# Patient Record
Sex: FEMALE | Race: WHITE | NOT HISPANIC OR LATINO
[De-identification: names, ages, dates, MRNs, and addresses within clinical notes are randomized per-mention and may not be internally consistent; named-entity substitution may affect disease eponyms.]

---

## 2019-08-15 PROBLEM — Z00.00 ENCOUNTER FOR PREVENTIVE HEALTH EXAMINATION: Noted: 2019-08-15

## 2019-08-19 ENCOUNTER — APPOINTMENT (OUTPATIENT)
Dept: MAMMOGRAPHY | Facility: CLINIC | Age: 59
End: 2019-08-19

## 2019-08-23 ENCOUNTER — APPOINTMENT (OUTPATIENT)
Dept: ULTRASOUND IMAGING | Facility: CLINIC | Age: 59
End: 2019-08-23

## 2021-11-26 ENCOUNTER — TRANSCRIPTION ENCOUNTER (OUTPATIENT)
Age: 61
End: 2021-11-26

## 2022-05-27 ENCOUNTER — APPOINTMENT (OUTPATIENT)
Dept: FAMILY MEDICINE | Facility: HOSPITAL | Age: 62
End: 2022-05-27

## 2022-06-11 ENCOUNTER — RESULT REVIEW (OUTPATIENT)
Age: 62
End: 2022-06-11

## 2022-06-11 ENCOUNTER — OUTPATIENT (OUTPATIENT)
Dept: OUTPATIENT SERVICES | Facility: HOSPITAL | Age: 62
LOS: 1 days | End: 2022-06-11

## 2022-06-11 ENCOUNTER — NON-APPOINTMENT (OUTPATIENT)
Age: 62
End: 2022-06-11

## 2022-06-11 ENCOUNTER — APPOINTMENT (OUTPATIENT)
Dept: OBGYN | Facility: HOSPITAL | Age: 62
End: 2022-06-11

## 2022-06-11 DIAGNOSIS — Z12.39 ENCOUNTER FOR OTHER SCREENING FOR MALIGNANT NEOPLASM OF BREAST: ICD-10-CM

## 2022-06-11 DIAGNOSIS — Z11.51 ENCOUNTER FOR SCREENING FOR HUMAN PAPILLOMAVIRUS (HPV): ICD-10-CM

## 2022-06-11 DIAGNOSIS — Z12.4 ENCOUNTER FOR SCREENING FOR MALIGNANT NEOPLASM OF CERVIX: ICD-10-CM

## 2022-06-13 LAB — HPV HIGH+LOW RISK DNA PNL CVX: SIGNIFICANT CHANGE UP

## 2022-06-15 LAB — CYTOLOGY SPEC DOC CYTO: SIGNIFICANT CHANGE UP

## 2022-06-17 ENCOUNTER — OUTPATIENT (OUTPATIENT)
Dept: OUTPATIENT SERVICES | Facility: HOSPITAL | Age: 62
LOS: 1 days | End: 2022-06-17
Payer: COMMERCIAL

## 2022-06-17 ENCOUNTER — RESULT REVIEW (OUTPATIENT)
Age: 62
End: 2022-06-17

## 2022-06-17 ENCOUNTER — APPOINTMENT (OUTPATIENT)
Dept: MAMMOGRAPHY | Facility: CLINIC | Age: 62
End: 2022-06-17
Payer: COMMERCIAL

## 2022-06-17 DIAGNOSIS — Z00.8 ENCOUNTER FOR OTHER GENERAL EXAMINATION: ICD-10-CM

## 2022-06-17 PROCEDURE — 77067 SCR MAMMO BI INCL CAD: CPT

## 2022-06-17 PROCEDURE — 77063 BREAST TOMOSYNTHESIS BI: CPT

## 2022-06-17 PROCEDURE — 77067 SCR MAMMO BI INCL CAD: CPT | Mod: 26

## 2022-06-17 PROCEDURE — 77063 BREAST TOMOSYNTHESIS BI: CPT | Mod: 26

## 2022-06-22 ENCOUNTER — NON-APPOINTMENT (OUTPATIENT)
Age: 62
End: 2022-06-22

## 2022-10-21 ENCOUNTER — APPOINTMENT (OUTPATIENT)
Dept: INTERNAL MEDICINE | Facility: CLINIC | Age: 62
End: 2022-10-21

## 2023-02-02 ENCOUNTER — EMERGENCY (EMERGENCY)
Facility: HOSPITAL | Age: 63
LOS: 1 days | Discharge: HOME | End: 2023-02-02
Attending: EMERGENCY MEDICINE | Admitting: EMERGENCY MEDICINE
Payer: COMMERCIAL

## 2023-02-02 VITALS
WEIGHT: 220.02 LBS | OXYGEN SATURATION: 97 % | RESPIRATION RATE: 16 BRPM | HEIGHT: 65 IN | SYSTOLIC BLOOD PRESSURE: 149 MMHG | HEART RATE: 76 BPM | TEMPERATURE: 98 F | DIASTOLIC BLOOD PRESSURE: 92 MMHG

## 2023-02-02 VITALS
SYSTOLIC BLOOD PRESSURE: 137 MMHG | TEMPERATURE: 98 F | HEART RATE: 71 BPM | OXYGEN SATURATION: 97 % | DIASTOLIC BLOOD PRESSURE: 88 MMHG | RESPIRATION RATE: 16 BRPM

## 2023-02-02 PROCEDURE — 73130 X-RAY EXAM OF HAND: CPT | Mod: 26,RT

## 2023-02-02 PROCEDURE — 73110 X-RAY EXAM OF WRIST: CPT

## 2023-02-02 PROCEDURE — 72040 X-RAY EXAM NECK SPINE 2-3 VW: CPT

## 2023-02-02 PROCEDURE — 73110 X-RAY EXAM OF WRIST: CPT | Mod: 26,RT

## 2023-02-02 PROCEDURE — 99284 EMERGENCY DEPT VISIT MOD MDM: CPT

## 2023-02-02 PROCEDURE — 73030 X-RAY EXAM OF SHOULDER: CPT | Mod: 26,RT

## 2023-02-02 PROCEDURE — 73502 X-RAY EXAM HIP UNI 2-3 VIEWS: CPT | Mod: 26,RT

## 2023-02-02 PROCEDURE — 73130 X-RAY EXAM OF HAND: CPT

## 2023-02-02 PROCEDURE — 72040 X-RAY EXAM NECK SPINE 2-3 VW: CPT | Mod: 26

## 2023-02-02 PROCEDURE — 73562 X-RAY EXAM OF KNEE 3: CPT | Mod: 26,LT

## 2023-02-02 PROCEDURE — 73502 X-RAY EXAM HIP UNI 2-3 VIEWS: CPT

## 2023-02-02 PROCEDURE — 73030 X-RAY EXAM OF SHOULDER: CPT

## 2023-02-02 PROCEDURE — 73562 X-RAY EXAM OF KNEE 3: CPT

## 2023-02-02 RX ORDER — CYCLOBENZAPRINE HYDROCHLORIDE 10 MG/1
1 TABLET, FILM COATED ORAL
Qty: 12 | Refills: 0
Start: 2023-02-02 | End: 2023-02-05

## 2023-02-02 RX ORDER — CYCLOBENZAPRINE HYDROCHLORIDE 10 MG/1
10 TABLET, FILM COATED ORAL ONCE
Refills: 0 | Status: COMPLETED | OUTPATIENT
Start: 2023-02-02 | End: 2023-02-02

## 2023-02-02 RX ORDER — IBUPROFEN 200 MG
1 TABLET ORAL
Qty: 20 | Refills: 0
Start: 2023-02-02 | End: 2023-02-06

## 2023-02-02 RX ORDER — IBUPROFEN 200 MG
600 TABLET ORAL ONCE
Refills: 0 | Status: COMPLETED | OUTPATIENT
Start: 2023-02-02 | End: 2023-02-02

## 2023-02-02 RX ORDER — LIDOCAINE 4 G/100G
1 CREAM TOPICAL ONCE
Refills: 0 | Status: COMPLETED | OUTPATIENT
Start: 2023-02-02 | End: 2023-02-02

## 2023-02-02 RX ADMIN — LIDOCAINE 1 PATCH: 4 CREAM TOPICAL at 12:37

## 2023-02-02 RX ADMIN — Medication 600 MILLIGRAM(S): at 13:07

## 2023-02-02 RX ADMIN — CYCLOBENZAPRINE HYDROCHLORIDE 10 MILLIGRAM(S): 10 TABLET, FILM COATED ORAL at 12:37

## 2023-02-02 RX ADMIN — Medication 600 MILLIGRAM(S): at 12:37

## 2023-02-02 NOTE — ED PROVIDER NOTE - NSFOLLOWUPINSTRUCTIONS_ED_ALL_ED_FT
Follow-up with orthopedist for reevaluation, ongoing care and treatment.  Rest, ice compresses, Weightbearing as tolerated. Ace wrap for compression. Take Motrin as directed for pain and Flexeril as prescribed for muscle spasms.  If having worsening of symptoms or other related symptoms, return to the ER immediately.    Knee Pain    WHAT YOU NEED TO KNOW:    Knee pain may start suddenly, or it may be a long-term problem. You may have pain on the side, front, or back of your knee. You may have knee stiffness and swelling. You may hear popping sounds or feel like your knee is giving way or locking up as you walk. You may feel pain when you sit, stand, walk, or climb up and down stairs. Knee pain can be caused by conditions such as obesity, inflammation, or strains or tears in ligaments or tendons.     DISCHARGE INSTRUCTIONS:    Return to the emergency department if:   •Your pain is worse, even after treatment.       •You cannot bend or straighten your leg completely.       •The swelling around your knee does not go down even with treatment.      •Your knee is painful and hot to the touch.       Contact your healthcare provider if:   •You have questions or concerns about your condition or care.           Medicines: You may need any of the following:   •NSAIDs help decrease swelling and pain or fever. This medicine is available with or without a doctor's order. NSAIDs can cause stomach bleeding or kidney problems in certain people. If you take blood thinner medicine, always ask your healthcare provider if NSAIDs are safe for you. Always read the medicine label and follow directions.      •Acetaminophen decreases pain and fever. It is available without a doctor's order. Ask how much to take and how often to take it. Follow directions. Read the labels of all other medicines you are using to see if they also contain acetaminophen, or ask your doctor or pharmacist. Acetaminophen can cause liver damage if not taken correctly.      •Prescription pain medicine may be given. Ask your healthcare provider how to take this medicine safely. Some prescription pain medicines contain acetaminophen. Do not take other medicines that contain acetaminophen without talking to your healthcare provider. Too much acetaminophen may cause liver damage. Prescription pain medicine may cause constipation. Ask your healthcare provider how to prevent or treat constipation.       •Take your medicine as directed. Contact your healthcare provider if you think your medicine is not helping or if you have side effects. Tell your provider if you are allergic to any medicine. Keep a list of the medicines, vitamins, and herbs you take. Include the amounts, and when and why you take them. Bring the list or the pill bottles to follow-up visits. Carry your medicine list with you in case of an emergency.      What you can do to manage your symptoms:   •Rest your knee so it can heal. Limit activities that increase your pain. Do low-impact exercises, such as walking or swimming.       •Apply ice to help reduce swelling and pain. Use an ice pack, or put crushed ice in a plastic bag. Cover it with a towel before you apply it to your knee. Apply ice for 15 to 20 minutes every hour, or as directed.      •Apply compression to help reduce swelling. Use a brace or bandage only as directed.      •Elevate your knee to help decrease pain and swelling. Elevate your knee while you are sitting or lying down. Prop your leg on pillows to keep your knee above the level of your heart.      •Prevent your knee from moving as directed. Your healthcare provider may put on a cast or splint. You may need to wear a leg brace to stabilize your knee. A leg brace can be adjusted to increase your range of motion as your knee heals.  Hinged Knee Braces            What you can do to prevent knee pain:   •Maintain a healthy weight. Extra weight increases your risk for knee pain. Ask your healthcare provider how much you should weigh. He or she can help you create a safe weight loss plan if you need to lose weight.      •Exercise or train properly. Use the correct equipment for sports. Wear shoes that provide good support. Check your posture often as you exercise, play sports, or train for an event. This can help prevent stress and strain on your knees. Rest between sessions so you do not overwork your knees.      Follow up with your healthcare provider within 24 hours or as directed: You may need follow-up treatments, such as steroid injections to decrease pain. Write down your questions so you remember to ask them during your visits.       Wrist Sprain, Adult      A wrist sprain is a stretch or tear in the strong tissues that connect the wrist bones to each other. These strong tissues are called ligaments. There are three types of wrist sprains:  •Grade 1. The ligament is stretched more than normal. There may be a minor amount of wrist pain.      •Grade 2. The ligament is partially torn. You may be able to move your wrist, but not very much. There may be a moderate amount of wrist pain.      •Grade 3. The ligament or ligaments are completely torn. You may find it difficult to move your wrist even a little. There may be a significant amount of wrist pain.        What are the causes?    This condition may be caused by using the wrist too much during sports, exercise, or work. It can also happen due to a fall or during an accident.      What increases the risk?    You are more likely to develop this condition if:  •You had a previous wrist or arm injury.      •You have poor wrist strength and flexibility.      •You play contact sports, such as football or soccer.      •You participate in sports that may result in a fall, such as skateboarding, biking, skiing, or snowboarding.      •You do not exercise regularly.      •You use exercise equipment that does not fit well.        What are the signs or symptoms?    Symptoms of this condition include:  •Pain in the wrist, arm, or hand.      •Swelling or bruised skin near the wrist, hand, or arm. The skin may look yellow or blue.      •Stiffness or trouble moving the hand.      •Hearing a noise, like a pop or a snap, at the time of injury, or feeling a tear at the time of the injury.      •A warm feeling in the skin around the wrist.        How is this diagnosed?    This condition is diagnosed with a physical exam. Sometimes an X-ray is taken to make sure a bone did not break. You may also have an MRI of your wrist to check for torn ligaments.      How is this treated?    This condition is treated by resting and applying ice to your wrist. Additional treatment may include:  •Taking medicine for pain and inflammation.      •Wearing a splint, brace, or cast for a short period of time to keep your wrist from moving (immobilized).      •Doing exercises to strengthen and stretch your wrist.      •Having surgery. This may be done if the ligament is completely torn.        Follow these instructions at home:    If you have a splint or brace:     •Wear the splint or brace as told by your health care provider. Remove it only as told by your health care provider.       •Loosen it if your fingers tingle, become numb, or turn cold and blue.      •Keep it clean.    •If the splint or brace is not waterproof:   •Do not let it get wet.      •Cover it with a watertight covering when you take a bath or a shower.        If you have a cast:     • Do not put pressure on any part of the cast until it is fully hardened. This may take several hours.       • Do not stick anything inside the cast to scratch your skin. Doing that increases your risk of infection.       •Check the skin around the cast every day. Tell your health care provider about any concerns.       •You may put lotion on dry skin around the edges of the cast. Do not put lotion on the skin underneath the cast.      •Keep it clean.    •If the cast is not waterproof:   •Do not let it get wet.      •Cover it with a watertight covering when you take a bath or shower.          Managing pain, stiffness, and swelling    •If directed, put ice on the injured area. To do this:  •If you have a removable splint or brace, remove it as told by your health care provider.      •Put ice in a plastic bag.      •Place a towel between your skin and the bag or between the splint or cast and the bag.      •Leave the ice on for 20 minutes, 2–3 times a day.      •Remove the ice if your skin turns bright red. This is very important. If you cannot feel pain, heat, or cold, you have a greater risk of damage to the area.        •Move your fingers often to reduce stiffness and swelling.      •Raise (elevate) the injured area above the level of your heart while you are sitting or lying down.      Activity     •Rest your wrist as told by your health care provider. Do not do things that cause pain.      •Ask your health care provider when it is safe to drive if you have a splint, brace, or cast on your wrist.      •Do exercises as told by your health care provider.      •Return to your normal activities as told by your health care provider. Ask your health care provider what activities are safe for you.      General instructions     •Take over-the-counter and prescription medicines only as told by your health care provider.      • Do not use any products that contain nicotine or tobacco, such as cigarettes, e-cigarettes, and chewing tobacco. These can delay healing. If you need help quitting, ask your health care provider.      •Keep all follow-up visits. This is important.        Contact a health care provider if:    •Your pain, bruising, or swelling gets worse.      •Your skin becomes red, gets a rash, or has open sores.      •Your pain does not get better or it gets worse.        Get help right away if:    •You have a new or sudden sharp pain in the hand, arm, or wrist.      •You have tingling or numbness in your hand.      •Your fingers turn white, very red, or cold and blue.      •You cannot move your fingers.        Summary    •A wrist sprain is damage to ligaments in your wrist.      •Wrist sprains can range from mild to severe.      •Return to your normal activities as told by your health care provider. Ask your health care provider what activities are safe for you.      •You may need to wear a splint, brace, or cast for a short period of time.      This information is not intended to replace advice given to you by your health care provider. Make sure you discuss any questions you have with your health care provider.

## 2023-02-02 NOTE — ED PROVIDER NOTE - CARE PROVIDER_API CALL
Min De La Cruz)  Orthopaedic Sports Medicine; Orthopaedic Surgery  [address]  [place] [address]  Phone: [phone]  Fax: [phone]  Follow Up Time: 1-3 Days

## 2023-02-02 NOTE — ED ADULT NURSE NOTE - OBJECTIVE STATEMENT
63 yo female presents to the ED with multiple complaints of pain s/p slip and fal on wet floor, pt stated she slipped on wet floor 63 yo female presents to the ED with multiple complaints of pain s/p slip and fall on wet floor, pt stated she slipped on wet floor. · Patient states that she slipped on wet floor at work this morning when her right leg went in front and fell onto her left knee.  Patient complaining of pain to her left knee, right thumb/right wrist/right shoulder and neck. Pt has old right wrist injury. Denies head trauma, LOC, dizziness, vision changes, use of blood thinners, numbness, tingling, weakness, chest pain, shortness of breath, abdominal pain, nausea, vomiting, open wounds, other injuries or symptoms.

## 2023-02-02 NOTE — ED PROVIDER NOTE - PATIENT PORTAL LINK FT
You can access the FollowMyHealth Patient Portal offered by [facility] by registering at the following website: [email or web address]. By joining [employer]’s FollowMyHealth portal, you will also be able to view your health information using other applications (apps) compatible with our system.

## 2023-02-02 NOTE — ED PROVIDER NOTE - CARE PLAN
Principal Discharge DX:	Left knee injury  Secondary Diagnosis:	Fall  Secondary Diagnosis:	Cervical strain  Secondary Diagnosis:	Right wrist injury   1

## 2023-02-02 NOTE — ED PROVIDER NOTE - PROGRESS NOTE DETAILS
Reevaluated patient at bedside.  Patient feeling much improved.  Discussed the results of all diagnostic testing in ED and copies of all reports given. Will rx motrin and flexeril, ace wrap for left knee, has old right wrist injury, f/u ortho.  An opportunity to ask questions was given.  Discussed the importance of prompt, close medical follow-up.  Patient will return with any changes, concerns or persistent / worsening symptoms.  Understanding of all instructions verbalized.

## 2023-02-02 NOTE — ED PROVIDER NOTE - OBJECTIVE STATEMENT
62-year-old female with history of hypothyroidism presents with complaint of left knee/right wrist/thumb/shoulder and neck pain status post fall today.  Patient states that she slipped on wet floor at work this morning when her right leg went in front and fell onto her left knee.  Patient complaining of pain to her left knee, right thumb/right wrist/right shoulder and neck. Pt has old right wrist injury. Denies head trauma, LOC, dizziness, vision changes, use of blood thinners, numbness, tingling, weakness, chest pain, shortness of breath, abdominal pain, nausea, vomiting, open wounds, other injuries or symptoms.

## 2023-02-02 NOTE — ED ADULT TRIAGE NOTE - NS ED TRIAGE AVPU SCALE
1
Alert-The patient is alert, awake and responds to voice. The patient is oriented to time, place, and person. The triage nurse is able to obtain subjective information.

## 2023-02-02 NOTE — ED PROVIDER NOTE - CLINICAL SUMMARY MEDICAL DECISION MAKING FREE TEXT BOX
Patient is a 62-year-old female who presents to the emergency room with chief complaint of knee pain and right wrist pain and shoulder pain status post fall.  Past medical history of hypothyroidism.  Patient reports that she slipped on a wet floor at work this morning when her right leg gave out and she fell onto her left knee.  She reports pain to her left knee, right thumb, right wrist, right shoulder and neck.  Does endorse a previous right wrist injury.  Denies any head trauma LOC and patient is not on anticoagulants.  Denies any visual changes nausea vomiting chest pain shortness of breath or abdominal pain.  Denies any extremity numbness or weakness.  On exam patient is lying in bed resting comfortably in no acute distress normocephalic atraumatic pupils are equal round and reactive heart is regular rate lungs are clear to auscultation abdomen soft nontender nondistended there is no pelvic instability noted.  No midline C-T-L tenderness to palpation.  Full range of motion of bilateral upper extremities and lower extremities neurovascular intact x4.  Patient with diffuse tenderness to palpation to the left knee no skin changes noted patient able to range the knee but reports pain on range of motion.  There is tenderness to palpation to the right wrist however no acute deformity and patient able to move in all planes.  No snuffbox tenderness to palpation.  There is mild tenderness to palpation over the right shoulder although patient has full range of motion.  Patient presented to the emergency room with pain status post mechanical fall.  Neurovascularly intact with no head trauma.  Will obtain x-ray imaging and monitor.  Independent review of x-ray imaging reveals slight effusion of the left knee with no fracture will place in a 6.  No fracture of the right hip.  There is an old fracture of the right wrist but nothing acute.  No acute shoulder fracture.  No fracture noted to the C-spine.  Results reviewed copy provided patient stable for discharge home with outpatient pain control close follow-up.  Was advised that sometimes fractures in perfect alignment can be missed on initial imaging and therefore prompt follow-up is highly recommended.

## 2023-02-02 NOTE — ED PROVIDER NOTE - MUSCULOSKELETAL, MLM
Spine appears normal, range of motion is not limited, bilateral paravertebral cervical spine tenderness with FROM, no stepoffs/ecchymosis noted, no midline tenderness, +ttp left anterior knee with FROM, skin intact, +mild tenderness right distal radius and proximal right 1st MCPJ with FROM, skin intact, no erythema noted, no snuffbox tenderness, +mild R shoulder ttp, skin intact, FROM RUE, distal pulses and sensation intact, able to make a fist, NVI, LUE NT with FROM, B hips/thigh/tib-fib/ankle/foot NT with FROM, toes warm & mobile, distal pulses and sensation intact, no foot drop B, NVI

## 2023-02-02 NOTE — ED ADULT TRIAGE NOTE - CHIEF COMPLAINT QUOTE
" I slipped on wet floor at work and fell, my neck, upper back, both legs, both knee, right thumb,  wrist, shoulder hurts "

## 2024-01-23 PROBLEM — E03.9 HYPOTHYROIDISM, UNSPECIFIED: Chronic | Status: ACTIVE | Noted: 2023-02-02

## 2024-01-29 ENCOUNTER — APPOINTMENT (OUTPATIENT)
Dept: NEUROLOGY | Facility: CLINIC | Age: 64
End: 2024-01-29

## 2024-12-11 ENCOUNTER — INPATIENT (INPATIENT)
Facility: HOSPITAL | Age: 64
LOS: 1 days | Discharge: HOME | DRG: 948 | End: 2024-12-13
Attending: INTERNAL MEDICINE | Admitting: INTERNAL MEDICINE
Payer: COMMERCIAL

## 2024-12-11 VITALS
WEIGHT: 218.04 LBS | HEIGHT: 65 IN | DIASTOLIC BLOOD PRESSURE: 85 MMHG | TEMPERATURE: 98 F | RESPIRATION RATE: 18 BRPM | OXYGEN SATURATION: 94 % | SYSTOLIC BLOOD PRESSURE: 117 MMHG | HEART RATE: 88 BPM

## 2024-12-11 DIAGNOSIS — R74.01 ELEVATION OF LEVELS OF LIVER TRANSAMINASE LEVELS: ICD-10-CM

## 2024-12-11 LAB
ALBUMIN SERPL ELPH-MCNC: 3.8 G/DL — SIGNIFICANT CHANGE UP (ref 3.3–5)
ALP SERPL-CCNC: 158 U/L — HIGH (ref 40–120)
ALT FLD-CCNC: 1058 U/L — HIGH (ref 12–78)
ANION GAP SERPL CALC-SCNC: 8 MMOL/L — SIGNIFICANT CHANGE UP (ref 5–17)
APPEARANCE UR: ABNORMAL
APTT BLD: 31.9 SEC — SIGNIFICANT CHANGE UP (ref 24.5–35.6)
AST SERPL-CCNC: 181 U/L — HIGH (ref 15–37)
BASOPHILS # BLD AUTO: 0.05 K/UL — SIGNIFICANT CHANGE UP (ref 0–0.2)
BASOPHILS NFR BLD AUTO: 0.6 % — SIGNIFICANT CHANGE UP (ref 0–2)
BILIRUB SERPL-MCNC: 1 MG/DL — SIGNIFICANT CHANGE UP (ref 0.2–1.2)
BILIRUB UR-MCNC: ABNORMAL
BUN SERPL-MCNC: 17 MG/DL — SIGNIFICANT CHANGE UP (ref 7–23)
CALCIUM SERPL-MCNC: 9.9 MG/DL — SIGNIFICANT CHANGE UP (ref 8.5–10.1)
CHLORIDE SERPL-SCNC: 104 MMOL/L — SIGNIFICANT CHANGE UP (ref 96–108)
CO2 SERPL-SCNC: 25 MMOL/L — SIGNIFICANT CHANGE UP (ref 22–31)
COLOR SPEC: SIGNIFICANT CHANGE UP
CREAT SERPL-MCNC: 0.92 MG/DL — SIGNIFICANT CHANGE UP (ref 0.5–1.3)
DIFF PNL FLD: NEGATIVE — SIGNIFICANT CHANGE UP
EGFR: 70 ML/MIN/1.73M2 — SIGNIFICANT CHANGE UP
EOSINOPHIL # BLD AUTO: 0.19 K/UL — SIGNIFICANT CHANGE UP (ref 0–0.5)
EOSINOPHIL NFR BLD AUTO: 2.5 % — SIGNIFICANT CHANGE UP (ref 0–6)
GLUCOSE SERPL-MCNC: 112 MG/DL — HIGH (ref 70–99)
GLUCOSE UR QL: NEGATIVE MG/DL — SIGNIFICANT CHANGE UP
HCT VFR BLD CALC: 43.8 % — SIGNIFICANT CHANGE UP (ref 34.5–45)
HGB BLD-MCNC: 14.9 G/DL — SIGNIFICANT CHANGE UP (ref 11.5–15.5)
IMM GRANULOCYTES NFR BLD AUTO: 0.4 % — SIGNIFICANT CHANGE UP (ref 0–0.9)
INR BLD: 0.96 RATIO — SIGNIFICANT CHANGE UP (ref 0.85–1.16)
KETONES UR-MCNC: ABNORMAL MG/DL
LEUKOCYTE ESTERASE UR-ACNC: ABNORMAL
LIDOCAIN IGE QN: 16 U/L — SIGNIFICANT CHANGE UP (ref 13–75)
LYMPHOCYTES # BLD AUTO: 3.22 K/UL — SIGNIFICANT CHANGE UP (ref 1–3.3)
LYMPHOCYTES # BLD AUTO: 41.5 % — SIGNIFICANT CHANGE UP (ref 13–44)
MCHC RBC-ENTMCNC: 32.7 PG — SIGNIFICANT CHANGE UP (ref 27–34)
MCHC RBC-ENTMCNC: 34 G/DL — SIGNIFICANT CHANGE UP (ref 32–36)
MCV RBC AUTO: 96.1 FL — SIGNIFICANT CHANGE UP (ref 80–100)
MONOCYTES # BLD AUTO: 0.53 K/UL — SIGNIFICANT CHANGE UP (ref 0–0.9)
MONOCYTES NFR BLD AUTO: 6.8 % — SIGNIFICANT CHANGE UP (ref 2–14)
NEUTROPHILS # BLD AUTO: 3.73 K/UL — SIGNIFICANT CHANGE UP (ref 1.8–7.4)
NEUTROPHILS NFR BLD AUTO: 48.2 % — SIGNIFICANT CHANGE UP (ref 43–77)
NITRITE UR-MCNC: NEGATIVE — SIGNIFICANT CHANGE UP
NRBC # BLD: 0 /100 WBCS — SIGNIFICANT CHANGE UP (ref 0–0)
PH UR: 5.5 — SIGNIFICANT CHANGE UP (ref 5–8)
PLATELET # BLD AUTO: 278 K/UL — SIGNIFICANT CHANGE UP (ref 150–400)
POTASSIUM SERPL-MCNC: 4.1 MMOL/L — SIGNIFICANT CHANGE UP (ref 3.5–5.3)
POTASSIUM SERPL-SCNC: 4.1 MMOL/L — SIGNIFICANT CHANGE UP (ref 3.5–5.3)
PROT SERPL-MCNC: 8.2 G/DL — SIGNIFICANT CHANGE UP (ref 6–8.3)
PROT UR-MCNC: 30 MG/DL
PROTHROM AB SERPL-ACNC: 11.2 SEC — SIGNIFICANT CHANGE UP (ref 9.9–13.4)
RBC # BLD: 4.56 M/UL — SIGNIFICANT CHANGE UP (ref 3.8–5.2)
RBC # FLD: 13 % — SIGNIFICANT CHANGE UP (ref 10.3–14.5)
SODIUM SERPL-SCNC: 137 MMOL/L — SIGNIFICANT CHANGE UP (ref 135–145)
SP GR SPEC: 1.03 — HIGH (ref 1–1.03)
UROBILINOGEN FLD QL: 1 MG/DL — SIGNIFICANT CHANGE UP (ref 0.2–1)
WBC # BLD: 7.75 K/UL — SIGNIFICANT CHANGE UP (ref 3.8–10.5)
WBC # FLD AUTO: 7.75 K/UL — SIGNIFICANT CHANGE UP (ref 3.8–10.5)

## 2024-12-11 PROCEDURE — 74183 MRI ABD W/O CNTR FLWD CNTR: CPT | Mod: 26

## 2024-12-11 PROCEDURE — 76705 ECHO EXAM OF ABDOMEN: CPT | Mod: 26

## 2024-12-11 PROCEDURE — 71045 X-RAY EXAM CHEST 1 VIEW: CPT | Mod: 26

## 2024-12-11 PROCEDURE — 93010 ELECTROCARDIOGRAM REPORT: CPT

## 2024-12-11 PROCEDURE — 99285 EMERGENCY DEPT VISIT HI MDM: CPT

## 2024-12-11 PROCEDURE — 74177 CT ABD & PELVIS W/CONTRAST: CPT | Mod: 26,MC

## 2024-12-11 RX ORDER — LEVOTHYROXINE SODIUM 150 MCG
1 TABLET ORAL
Refills: 0 | DISCHARGE

## 2024-12-11 RX ORDER — SODIUM CHLORIDE 9 MG/ML
1000 INJECTION, SOLUTION INTRAMUSCULAR; INTRAVENOUS; SUBCUTANEOUS ONCE
Refills: 0 | Status: COMPLETED | OUTPATIENT
Start: 2024-12-11 | End: 2024-12-11

## 2024-12-11 RX ORDER — FAMOTIDINE 20 MG/1
20 TABLET, FILM COATED ORAL ONCE
Refills: 0 | Status: COMPLETED | OUTPATIENT
Start: 2024-12-11 | End: 2024-12-11

## 2024-12-11 RX ORDER — 0.9 % SODIUM CHLORIDE 0.9 %
1000 INTRAVENOUS SOLUTION INTRAVENOUS
Refills: 0 | Status: DISCONTINUED | OUTPATIENT
Start: 2024-12-11 | End: 2024-12-13

## 2024-12-11 RX ORDER — OMEPRAZOLE 20 MG/1
1 CAPSULE, DELAYED RELEASE ORAL
Refills: 0 | DISCHARGE

## 2024-12-11 RX ORDER — ONDANSETRON HYDROCHLORIDE 4 MG/1
4 TABLET, FILM COATED ORAL EVERY 8 HOURS
Refills: 0 | Status: DISCONTINUED | OUTPATIENT
Start: 2024-12-11 | End: 2024-12-12

## 2024-12-11 RX ORDER — PANTOPRAZOLE SODIUM 40 MG/1
40 TABLET, DELAYED RELEASE ORAL
Refills: 0 | Status: DISCONTINUED | OUTPATIENT
Start: 2024-12-11 | End: 2024-12-12

## 2024-12-11 RX ORDER — ALPRAZOLAM 0.5 MG
1 TABLET ORAL ONCE
Refills: 0 | Status: DISCONTINUED | OUTPATIENT
Start: 2024-12-11 | End: 2024-12-11

## 2024-12-11 RX ORDER — INFLUENZA VIRUS VACCINE 15; 15; 15; 15 UG/.5ML; UG/.5ML; UG/.5ML; UG/.5ML
0.5 SUSPENSION INTRAMUSCULAR ONCE
Refills: 0 | Status: DISCONTINUED | OUTPATIENT
Start: 2024-12-11 | End: 2024-12-13

## 2024-12-11 RX ORDER — LEVOTHYROXINE SODIUM 150 MCG
125 TABLET ORAL DAILY
Refills: 0 | Status: DISCONTINUED | OUTPATIENT
Start: 2024-12-11 | End: 2024-12-12

## 2024-12-11 RX ADMIN — Medication 100 MILLILITER(S): at 20:21

## 2024-12-11 RX ADMIN — FAMOTIDINE 20 MILLIGRAM(S): 20 TABLET, FILM COATED ORAL at 13:45

## 2024-12-11 RX ADMIN — SODIUM CHLORIDE 1000 MILLILITER(S): 9 INJECTION, SOLUTION INTRAMUSCULAR; INTRAVENOUS; SUBCUTANEOUS at 13:45

## 2024-12-11 RX ADMIN — Medication 1 MILLIGRAM(S): at 17:57

## 2024-12-11 NOTE — ED PROVIDER NOTE - ATTENDING APP SHARED VISIT CONTRIBUTION OF CARE
Examination reveals a well-developed well-nourished overweight white female in no acute distress with a protuberant abdomen that is markedly tender in the epigastrium and right upper quadrant.  No guarding no rebound.  Neurologically patient's awake and alert oriented x 4 without any focal neurologic deficits.

## 2024-12-11 NOTE — ED ADULT NURSE NOTE - OBJECTIVE STATEMENT
Pt is AOX4, came to the ED with c/o ABD pain, n/v for 3 days. Pt states she went to PCP yesterday where they started her on PO ABT and antiacid medication yesterday. Pt states no ABD pain at this time. Pt denies n/v today. as PCP pt has elevated liver enzymes and pink-tinge urine. pt is able to ambulate and tolerate PO intake, small amounts. Pt has not vomited today. Allergies to sulfa, nickel, lobster. Hx hypothyroidism.  pending radiology results. care ongoing. call bell placed within reach.

## 2024-12-11 NOTE — ED ADULT NURSE NOTE - NSFALLUNIVINTERV_ED_ALL_ED
Bed/Stretcher in lowest position, wheels locked, appropriate side rails in place/Call bell, personal items and telephone in reach/Instruct patient to call for assistance before getting out of bed/chair/stretcher/Non-slip footwear applied when patient is off stretcher/Maybee to call system/Physically safe environment - no spills, clutter or unnecessary equipment/Purposeful proactive rounding/Room/bathroom lighting operational, light cord in reach

## 2024-12-11 NOTE — ED PROVIDER NOTE - PROGRESS NOTE DETAILS
Stable.  Resting comfortably.  Declines any pain medication emergency room at this time.  CAT scan unremarkable.  US results pending. Liver enzymes significantly elevated.  Spoke with GI Love VILLEDA.  Will see patient emergency for consult.  Dr. Greer will assume care

## 2024-12-11 NOTE — H&P ADULT - NSHPPHYSICALEXAM_GEN_ALL_CORE
Vitals last 24 hrs  T(C): 36.7 (12-11-24 @ 11:59), Max: 36.7 (12-11-24 @ 11:59)  HR: 88 (12-11-24 @ 11:59) (88 - 88)  BP: 117/85 (12-11-24 @ 11:59) (117/85 - 117/85)  RR: 18 (12-11-24 @ 11:59) (18 - 18)  SpO2: 94% (12-11-24 @ 11:59) (94% - 94%)    PHYSICAL EXAM:  GENERAL: NAD, well-groomed, well-developed  HEAD:  Atraumatic, Normocephalic  EYES: EOMI, PERRLA, conjunctiva and sclera clear  ENMT: No tonsillar erythema, exudates, or enlargement; Moist mucous membranes  NECK: Supple, No JVD, Normal thyroid  HEART: Regular rate and rhythm; No murmurs, rubs, or gallops  RESPIRATORY: CTA B/L, No W/R/R  ABDOMEN: Soft, epigastric tenderness, Nondistended; Bowel sounds present  NEUROLOGY: A&Ox3, nonfocal, moving all extremities  EXTREMITIES:  2+ Peripheral Pulses, No clubbing, cyanosis, or edema  SKIN: warm, dry, normal color, no rash or abnormal lesions

## 2024-12-11 NOTE — H&P ADULT - ASSESSMENT
64-year-old female with history of hypothyroidism presents with epigastric pain, nausea, and vomiting the past 5 days.   elevated transaminases, with elevated bilis in outpt labs  US abd- cholelithiasis, no biliary dilation, CT abd - no acute pathology    #Abd pain, elevated LFTs  +cholelithiasis on US  r/o choledocholithiasis-?passed stone  MRCP pending  GI cs appreciated  npo for now, ivf  iv zofran, pain control    #+UA- fu UCx,   pt without urinary symptoms  will monitor    #hypothyroidism- resume synthroid    #GI ppx- resume home ppi    #DVt ppx- ambulatory    [facility]   [phone]

## 2024-12-11 NOTE — CONSULT NOTE ADULT - ASSESSMENT
Transaminitis  Abdominal pain  N/V  Hepatic steatosis  Hx PUD    CT and US noted; hepatic steatosis; cholelithiasis no sign of ccy or biliary dilation  Bili and LFTs noted, trending down from outpatient  Pt currently asymptomatic  Suspect passed CBD stone  Check hepatitis panel for completion  Admit to check MRCP and trend LFTs  PPI 40mg qd  D/w pt and family at bedside    I reviewed the overnight course of events on the unit, re-confirming the patient history. I discussed the care with the patient  Differential diagnosis and plan of care discussed with patient after the evaluation  35 minutes spent on total encounter of which more than fifty percent of the encounter was spent counseling and/or coordinating care by the attending physician.

## 2024-12-11 NOTE — H&P ADULT - NSHPREVIEWOFSYSTEMS_GEN_ALL_CORE
REVIEW OF SYSTEMS:    CONSTITUTIONAL: No weakness, fevers or chills  EYES/ENT: No visual changes;  No vertigo or throat pain   NECK: No pain or stiffness  RESPIRATORY: No cough, wheezing, hemoptysis; No shortness of breath  CARDIOVASCULAR: No chest pain or palpitations  GASTROINTESTINAL: + abdominal or epigastric pain. No nausea, vomiting, or hematemesis; No diarrhea or constipation. No melena or hematochezia.  GENITOURINARY: No dysuria, frequency or hematuria  NEUROLOGICAL: No numbness or weakness  SKIN: No itching, rashes

## 2024-12-11 NOTE — ED PROVIDER NOTE - OBJECTIVE STATEMENT
64-year-old female with history of hypothyroidism presents with epigastric pain, nausea, and vomiting the past 5 days.  Pain started 5 days ago (December 7) after eating BLT.  Has had continued upper abdominal pain with nausea and vomiting since then.  Patient states her urine was bright orange yesterday.  Occasional chills, no known fevers.  No chest pain or shortness of breath.  Patient reports initially had numerous episodes of vomiting the first couple days.  No vomiting yesterday or today.  Was seen by primary care doctor yesterday and had blood work.  Was called today and was told had abnormal labs including bilirubin of 1.7, alk phos of 178, AST of 424, and ALT of 1290.  Overall pain has improved at this time but states has been eating very bland diet.  No current nausea or vomiting.  No dysuria or flank pain.  No previous abdominal surgeries.  Was told by primary care doctor to come to emergency room for further evaluation  PCP Dr Mcgraw

## 2024-12-11 NOTE — H&P ADULT - NSHPLABSRESULTS_GEN_ALL_CORE
LABS:                        14.9   7.75  )-----------( 278      ( 11 Dec 2024 13:41 )             43.8         137  |  104  |  17  ----------------------------<  112[H]  4.1   |  25  |  0.92    Ca    9.9      11 Dec 2024 13:41    TPro  8.2  /  Alb  3.8  /  TBili  1.0  /  DBili  x   /  AST  181[H]  /  ALT  1058[H]  /  AlkPhos  158[H]  12    PT/INR - ( 11 Dec 2024 13:41 )   PT: 11.2 sec;   INR: 0.96 ratio         PTT - ( 11 Dec 2024 13:41 )  PTT:31.9 sec  CAPILLARY BLOOD GLUCOSE            Urinalysis Basic - ( 11 Dec 2024 13:47 )    Color: Dark Yellow / Appearance: Cloudy / S.032 / pH: x  Gluc: x / Ketone: Trace mg/dL  / Bili: Moderate / Urobili: 1.0 mg/dL   Blood: x / Protein: 30 mg/dL / Nitrite: Negative   Leuk Esterase: Small / RBC: 1 /HPF / WBC 6 /HPF   Sq Epi: x / Non Sq Epi: x / Bacteria: Few /HPF        RADIOLOGY & ADDITIONAL TESTS:

## 2024-12-11 NOTE — H&P ADULT - HISTORY OF PRESENT ILLNESS
64-year-old female with history of hypothyroidism presents with epigastric pain, nausea, and vomiting the past 5 days.  Pain started 5 days ago (December 7) after eating BLT.  Has had continued upper abdominal pain with nausea and vomiting since then.  Patient states her urine was bright orange yesterday.  Occasional chills, no known fevers.  No chest pain or shortness of breath.  Patient reports initially had numerous episodes of vomiting the first couple days.  No vomiting yesterday or today.  Was seen by primary care doctor yesterday and had blood work.  Was called today and was told had abnormal labs including bilirubin of 1.7, alk phos of 178, AST of 424, and ALT of 1290.  Overall pain has improved at this time but states has been eating very bland diet.  No current nausea or vomiting.  No dysuria or flank pain.  No previous abdominal surgeries.  Was told by primary care doctor to come to emergency room for further evaluationPt states pain and nausea resolved about a day or two ago. She was sent in by PCP due to elevated bilirubin and LFTs.

## 2024-12-11 NOTE — CONSULT NOTE ADULT - SUBJECTIVE AND OBJECTIVE BOX
SURGERY PA CONSULT NOTE:    CHIEF COMPLAINT:  Patient is a 64y old  Female who presents with a chief complaint of abdominal pain (11 Dec 2024 16:47)    HPI FROM ED:  64-year-old female with history of hypothyroidism presents with epigastric pain, nausea, and vomiting the past 5 days.  Pain started 5 days ago () after eating BLT.  Has had continued upper abdominal pain with nausea and vomiting since then.  Patient states her urine was bright orange yesterday.  Occasional chills, no known fevers.  No chest pain or shortness of breath.  Patient reports initially had numerous episodes of vomiting the first couple days.  No vomiting yesterday or today.  Was seen by primary care doctor yesterday and had blood work.  Was called today and was told had abnormal labs including bilirubin of 1.7, alk phos of 178, AST of 424, and ALT of 1290.  Overall pain has improved at this time but states has been eating very bland diet.  No current nausea or vomiting.  No dysuria or flank pain.  No previous abdominal surgeries.  Was told by primary care doctor to come to emergency room for further evaluation. Pt states pain and nausea resolved about a day or two ago. She was sent in by PCP due to elevated bilirubin and LFTs. (11 Dec 2024 16:47)    INTERVAL HPI:   65 yo female with pmhx of hypothyroidism, diverticulosis, hiatal hernia presented to the ED with abdominal pain since Saturday night with vomiting. Patient also noticed that her urine was dark orange and dark. Also noticed that her bowel movement was grey in color. No diarrhea. Pain had improved but it peaked again on Monday and the pain usually comes after eating.   Hx of diverticulosis; last colonoscopy was 8 years ago, polyps seen per patient; does not follow up with GI now.    PAST MEDICAL HISTORY:  Hypothyroidism  Diverticulosis  Hiatal Hernia    PAST SURGICAL HISTORY:  None per patient     REVIEW OF SYSTEMS:  CONSTITUTIONAL: +chills once  EYES/ENT: No visual changes;  No vertigo or throat pain   NECK: No pain or stiffness  RESPIRATORY: No cough, wheezing, hemoptysis; No shortness of breath  CARDIOVASCULAR: No chest pain or palpitations  GASTROINTESTINAL: +epigastric pain. +vomiting, or hematemesis; No diarrhea or constipation. No melena or hematochezia.  GENITOURINARY: +dark urine  NEUROLOGICAL: No numbness or weakness  SKIN: No itching, rashes    MEDICATIONS:  Home Medications:  omeprazole 20 mg oral delayed release tablet: 1 tab(s) orally once a day (11 Dec 2024 16:48)  Synthroid 125 mcg (0.125 mg) oral tablet: 1 tab(s) orally once a day (11 Dec 2024 16:48)    MEDICATIONS  (STANDING):  dextrose 5% + sodium chloride 0.9%. 1000 milliLiter(s) (100 mL/Hr) IV Continuous <Continuous>  levothyroxine 125 MICROGram(s) Oral daily  pantoprazole    Tablet 40 milliGRAM(s) Oral before breakfast    MEDICATIONS  (PRN):  morphine  - Injectable 2 milliGRAM(s) IV Push every 6 hours PRN Severe Pain (7 - 10)  ondansetron Injectable 4 milliGRAM(s) IV Push every 8 hours PRN Nausea and/or Vomiting    ALLERGIES:  Crab (Other)  Lobster (Other)  Nickel (Rash)  Sulfa drugs (Rash)    SOCIAL HISTORY:  Smoking: Denies  ETOH: Rarely  DRUGS: Denies    FAMILY HISTORY:  CAD (Paternal and maternal side)      VITAL SIGNS:  Vital Signs Last 24 Hrs  T(C): 36.7 (11 Dec 2024 17:34), Max: 36.7 (11 Dec 2024 11:59)  T(F): 98 (11 Dec 2024 17:34), Max: 98 (11 Dec 2024 11:59)  HR: 75 (11 Dec 2024 17:34) (75 - 88)  BP: 121/82 (11 Dec 2024 17:34) (117/85 - 121/82)  RR: 18 (11 Dec 2024 17:34) (18 - 18)  SpO2: 98% (11 Dec 2024 17:34) (94% - 98%)    Parameters below as of 11 Dec 2024 17:34  Patient On (Oxygen Delivery Method): room air    PHYSICAL EXAM:  GENERAL: NAD, lying in bed comfortably  HEAD:  Atraumatic, normocephalic  EYES: EOMI, PERRLA, conjunctiva and sclera clear  NECK: Supple, trachea midline, no JVD  HEART: Regular rate and rhythm, no murmurs, rubs, or gallops  LUNGS: Unlabored respirations. Clear to auscultation bilaterally, no crackles, wheezing, or rhonchi  ABDOMEN: Soft, nontender, nondistended, +BS  EXTREMITIES: No calf tenderness  NERVOUS SYSTEM:  A&Ox3, moving all extremities, no focal deficits   SKIN: No rashes or lesions    LABS:                        14.9   7.75  )-----------( 278      ( 11 Dec 2024 13:41 )             43.8     137  |  104  |  17  ----------------------------<  112[H]  4.1   |  25  |  0.92    Ca    9.9      11 Dec 2024 13:41    TPro  8.2  /  Alb  3.8  /  TBili  1.0  /  DBili  x   /  AST  181[H]  /  ALT  1058[H]  /  AlkPhos  158[H]  12-11    PT/INR - ( 11 Dec 2024 13:41 )   PT: 11.2 sec;   INR: 0.96 ratio      PTT - ( 11 Dec 2024 13:41 )  PTT:31.9 sec  Urinalysis Basic - ( 11 Dec 2024 13:47 )    Color: Dark Yellow / Appearance: Cloudy / S.032 / pH: x  Gluc: x / Ketone: Trace mg/dL  / Bili: Moderate / Urobili: 1.0 mg/dL   Blood: x / Protein: 30 mg/dL / Nitrite: Negative   Leuk Esterase: Small / RBC: 1 /HPF / WBC 6 /HPF   Sq Epi: x / Non Sq Epi: x / Bacteria: Few /HPF    LIVER FUNCTIONS - ( 11 Dec 2024 13:41 )  Alb: 3.8 g/dL / Pro: 8.2 g/dL / ALK PHOS: 158 U/L / ALT: 1058 U/L / AST: 181 U/L / GGT: x           Urinalysis with Rflx Culture (collected 11 Dec 2024 13:47)      RADIOLOGY & ADDITIONAL STUDIES:  < from: US Abdomen Upper Quadrant Right (24 @ 14:57) >    ACC: [id number] EXAM:  US ABDOMEN RT UPR QUADRANT   ORDERED BY: CARL JEROME     PROCEDURE DATE:  2024    INTERPRETATION:  CLINICAL INFORMATION: Right upper quadrant pain  COMPARISON: None available.  TECHNIQUE: Sonography of theright upper quadrant.    FINDINGS:  Liver: Steatosis.  Bile ducts: Normal caliber. Common bile duct measures 6 mm.  Gallbladder: Cholelithiasis and gallbladder sludge without wall thickening or pericholecystic fluid..  Pancreas: Visualized portionsare within normal limits.  Right kidney: 9.6 cm. No hydronephrosis.  Ascites: None.  IVC: Visualized portions are within normal limits.    IMPRESSION:  Cholelithiasis without secondary signs of acute cholecystitis or biliary dilatation.Fatty liver.    --- End of Report ---    < from: CT Abdomen and Pelvis w/ IV Cont (24 @ 14:29) >    ACC: [id number] EXAM:  CT ABDOMEN AND PELVIS IC   ORDERED BY: CARL JEROME     PROCEDURE DATE:  2024    INTERPRETATION:  CLINICAL INFORMATION: Upper abdominal pain  COMPARISON: None.    CONTRAST/COMPLICATIONS:  IV Contrast: Omnipaque 350  90 cc administered   10 cc discarded  Oral Contrast: NONE    PROCEDURE:  CT of the Abdomen and Pelvis was performed.  Sagittal and coronal reformats were performed.    FINDINGS:  LOWER CHEST: Small hiatal hernia.    LIVER: Steatosis.  BILE DUCTS: Normal caliber.  GALLBLADDER: Within normal limits.  SPLEEN: Within normal limits.  PANCREAS: Within normal limits.  ADRENALS: Within normal limits.  KIDNEYS/URETERS: Within normal limits.    BLADDER: Within normal limits.  REPRODUCTIVE ORGANS:Myomatous uterus    BOWEL: No bowel obstruction. Colonic diverticulosis without acute diverticulitis. Appendix normal  PERITONEUM/RETROPERITONEUM: Within normal limits.  VESSELS: Within normal limits.  LYMPH NODES: No lymphadenopathy.  ABDOMINAL WALL: Tiny fat-containing umbilical hernia.  BONES: Within normal limits.    IMPRESSION:  No acute inflammatory or obstructive pathology. Diverticulosis coli without acute diverticulitis. Additional findings as discussed    --- End of Report ---      ASSESSMENT:  65 yo female with pmhx of hypothyroidism, diverticulosis, hiatal hernia admitted with abdominal pain, acute cholecystitis, transaminitis without leukocytosis, nontender abdomen currently  AFVSS      PLAN:  - NPO after midnight  - Planning for robotic cholecystectomy tomorrow   - Preop  - ICG  - Abx  - AM labs  - Trend LFTs  - T. bili wnl  - F/U MRCP  - Appreciate GI recommendations   - Case discussed with patient and Dr. Bailey
[facility]  Farzad Pond PA-C  [address] [place] [address] [phone]      Chief Complaint:  Patient is a 64y old  Female who presents with a chief complaint of abnormal labs    HPI:  64-year-old female with history of hypothyroidism presents with epigastric pain, nausea, and vomiting the past 5 days.  Pain started 5 days ago () after eating BLT.  Has had continued upper abdominal pain with nausea and vomiting since then.  Patient states her urine was bright orange yesterday.  Occasional chills, no known fevers.  No chest pain or shortness of breath.  Patient reports initially had numerous episodes of vomiting the first couple days.  No vomiting yesterday or today.  Was seen by primary care doctor yesterday and had blood work.  Was called today and was told had abnormal labs including bilirubin of 1.7, alk phos of 178, AST of 424, and ALT of 1290.  Overall pain has improved at this time but states has been eating very bland diet.  No current nausea or vomiting.  No dysuria or flank pain.  No previous abdominal surgeries.  Was told by primary care doctor to come to emergency room for further evaluation    INTERVAL HPI  Pt s/e with family at bedside in emergency department  Discussed same hx as above  Pt states she has had similar pain in the past but never as severe, often associated with spicy food  She had egd with Dr Abernathy within past couple years, reportedly had gastric ulcer at the time  Pt states pain and nausea resolved about a day or two ago. She was sent in by PCP due to elevated bilirubin and LFTs.  Denies prior hx of gallbladder disease  Denies EtOH misuse    Allergies:  Crab (Other)  Lobster (Other)  Nickel (Rash)  sulfa drugs (Rash)    PMHX/PSHX:  Hypothyroidism      ROS:   General:  No fevers, chills, night sweats, fatigue  Eyes:  Good vision, no reported pain  ENT:  No sore throat, pain, runny nose, dysphagia  CV:  No pain, palpitations, hypo/hypertension  Resp:  No dyspnea, cough, tachypnea, wheezing  GI:  +pain, +nausea, +vomiting, No diarrhea, No constipation, No weight loss, No fever, No pruritis, No rectal bleeding, No tarry stools, No dysphagia  :  No pain, bleeding, incontinence, nocturia  Muscle:  No pain, weakness  Neuro:  No weakness, tingling, memory problems  Psych:  No fatigue, insomnia, mood problems, depression  Endocrine:  No polyuria, polydipsia, cold/heat intolerance  Heme:  No petechiae, ecchymosis, easy bruisability  Skin:  No rash, tattoos, scars, edema      PHYSICAL EXAM:   Vital Signs:  Vital Signs Last 24 Hrs  T(C): 36.7 (11 Dec 2024 11:59), Max: 36.7 (11 Dec 2024 11:59)  T(F): 98 (11 Dec 2024 11:59), Max: 98 (11 Dec 2024 11:59)  HR: 88 (11 Dec 2024 11:59) (88 - 88)  BP: 117/85 (11 Dec 2024 11:59) (117/85 - 117/85)  BP(mean): --  RR: 18 (11 Dec 2024 11:59) (18 - 18)  SpO2: 94% (11 Dec 2024 11:59) (94% - 94%)    Parameters below as of 11 Dec 2024 11:59  Patient On (Oxygen Delivery Method): room air      Daily Height in cm: 165.1 (11 Dec 2024 11:59)    Daily     GENERAL:  Appears stated age  HEENT:  NC/AT  CHEST:  Full & symmetric excursion  HEART:  Regular rhythm  ABDOMEN:  Soft, non-tender, non-distended  EXTEREMITIES:  no cyanosis, clubbing or edema  SKIN:  No rash  NEURO:  Alert    LABS:                        14.9   7.75  )-----------( 278      ( 11 Dec 2024 13:41 )             43.8         137  |  104  |  17  ----------------------------<  112[H]  4.1   |  25  |  0.92    Ca    9.9      11 Dec 2024 13:41    TPro  8.2  /  Alb  3.8  /  TBili  1.0  /  DBili  x   /  AST  181[H]  /  ALT  1058[H]  /  AlkPhos  158[H]      LIVER FUNCTIONS - ( 11 Dec 2024 13:41 )  Alb: 3.8 g/dL / Pro: 8.2 g/dL / ALK PHOS: 158 U/L / ALT: 1058 U/L / AST: 181 U/L / GGT: x           PT/INR - ( 11 Dec 2024 13:41 )   PT: 11.2 sec;   INR: 0.96 ratio         PTT - ( 11 Dec 2024 13:41 )  PTT:31.9 sec  Urinalysis Basic - ( 11 Dec 2024 13:47 )    Color: Dark Yellow / Appearance: Cloudy / S.032 / pH: x  Gluc: x / Ketone: Trace mg/dL  / Bili: Moderate / Urobili: 1.0 mg/dL   Blood: x / Protein: 30 mg/dL / Nitrite: Negative   Leuk Esterase: Small / RBC: 1 /HPF / WBC 6 /HPF   Sq Epi: x / Non Sq Epi: x / Bacteria: Few /HPF        Lipase serum16        Imaging:  < from: CT Abdomen and Pelvis w/ IV Cont (24 @ 14:29) >    ACC: [id number] EXAM:  CT ABDOMEN AND PELVIS IC   ORDERED BY: CARL JEROME     PROCEDURE DATE:  2024          INTERPRETATION:  CLINICAL INFORMATION: Upper abdominal pain    COMPARISON: None.    CONTRAST/COMPLICATIONS:  IV Contrast: Omnipaque 350  90 cc administered   10 cc discarded  Oral Contrast: NONE  .    PROCEDURE:  CT of the Abdomen and Pelvis was performed.  Sagittal and coronal reformats were performed.    FINDINGS:  LOWER CHEST: Small hiatal hernia.    LIVER: Steatosis.  BILE DUCTS: Normal caliber.  GALLBLADDER: Within normal limits.  SPLEEN: Within normal limits.  PANCREAS: Within normal limits.  ADRENALS: Within normal limits.  KIDNEYS/URETERS: Within normal limits.    BLADDER: Within normal limits.  REPRODUCTIVE ORGANS:Myomatous uterus    BOWEL: No bowel obstruction. Colonic diverticulosis without acute   diverticulitis. Appendix normal  PERITONEUM/RETROPERITONEUM: Within normal limits.  VESSELS: Within normal limits.  LYMPH NODES: No lymphadenopathy.  ABDOMINAL WALL: Tiny fat-containing umbilical hernia.  BONES: Within normal limits.    IMPRESSION:  No acute inflammatory or obstructive pathology.    Diverticulosis coli without acute diverticulitis.    Additional findings as discussed  --- End of Report ---      LAUREANO PICHARDO MD; Attending Radiologist  This document has been electronically signed. Dec 11 2024  2:55PM    < end of copied text >      < from: US Abdomen Upper Quadrant Right (12.11.24 @ 14:57) >    ACC: [id number] EXAM:  US ABDOMEN RT UPR QUADRANT   ORDERED BY: CARL JEROME     PROCEDURE DATE:  2024          INTERPRETATION:  CLINICAL INFORMATION: Right upper quadrant pain    COMPARISON: None available.    TECHNIQUE: Sonography of theright upper quadrant.    FINDINGS:  Liver: Steatosis.  Bile ducts: Normal caliber. Common bile duct measures 6 mm.  Gallbladder: Cholelithiasis and gallbladder sludge without wall   thickening or pericholecystic fluid..  Pancreas: Visualized portionsare within normal limits.  Right kidney: 9.6 cm. No hydronephrosis.  Ascites: None.  IVC: Visualized portions are within normal limits.    IMPRESSION:  Cholelithiasis without secondary signs of acute cholecystitis or biliary   dilatation.  Fatty liver.    --- End of Report ---    LAUREANO PICHARDO MD; Attending Radiologist  This document has been electronically signed. Dec 11 2024  3:03PM    < end of copied text >

## 2024-12-11 NOTE — PATIENT PROFILE ADULT - FALL HARM RISK - UNIVERSAL INTERVENTIONS
Bed in lowest position, wheels locked, appropriate side rails in place/Call bell, personal items and telephone in reach/Instruct patient to call for assistance before getting out of bed or chair/Non-slip footwear when patient is out of bed/[place] to call system/Physically safe environment - no spills, clutter or unnecessary equipment/Purposeful Proactive Rounding/Room/bathroom lighting operational, light cord in reach

## 2024-12-11 NOTE — ED PROVIDER NOTE - DIFFERENTIAL DIAGNOSIS
Differentials include but not limited to cholecystitis, blocked biliary stone, pancreatitis Differential Diagnosis

## 2024-12-11 NOTE — ED PROVIDER NOTE - CLINICAL SUMMARY MEDICAL DECISION MAKING FREE TEXT BOX
Patient 64-year-old white female has had intermittent waxing and waning midepigastric and right upper quadrant abdominal pain with nausea and vomiting for the past few days.  No fever no chills no diarrhea no melena no bright red blood per rectum.  No dysuria no hematuria.  This case will require independently performed history and physical as well as labs liver functions as well as ultrasound of the right upper quadrant to assess for possibility of acute cholecystitis.

## 2024-12-12 ENCOUNTER — TRANSCRIPTION ENCOUNTER (OUTPATIENT)
Age: 64
End: 2024-12-12

## 2024-12-12 LAB
ABO RH CONFIRMATION: SIGNIFICANT CHANGE UP
ALBUMIN SERPL ELPH-MCNC: 2.9 G/DL — LOW (ref 3.3–5)
ALP SERPL-CCNC: 113 U/L — SIGNIFICANT CHANGE UP (ref 40–120)
ALT FLD-CCNC: 629 U/L — HIGH (ref 12–78)
ANION GAP SERPL CALC-SCNC: 6 MMOL/L — SIGNIFICANT CHANGE UP (ref 5–17)
AST SERPL-CCNC: 86 U/L — HIGH (ref 15–37)
BILIRUB SERPL-MCNC: 0.6 MG/DL — SIGNIFICANT CHANGE UP (ref 0.2–1.2)
BUN SERPL-MCNC: 12 MG/DL — SIGNIFICANT CHANGE UP (ref 7–23)
CALCIUM SERPL-MCNC: 8.5 MG/DL — SIGNIFICANT CHANGE UP (ref 8.5–10.1)
CHLORIDE SERPL-SCNC: 109 MMOL/L — HIGH (ref 96–108)
CO2 SERPL-SCNC: 26 MMOL/L — SIGNIFICANT CHANGE UP (ref 22–31)
CREAT SERPL-MCNC: 0.84 MG/DL — SIGNIFICANT CHANGE UP (ref 0.5–1.3)
EGFR: 79 ML/MIN/1.73M2 — SIGNIFICANT CHANGE UP
GLUCOSE SERPL-MCNC: 117 MG/DL — HIGH (ref 70–99)
HAV IGM SER-ACNC: SIGNIFICANT CHANGE UP
HBV CORE IGM SER-ACNC: SIGNIFICANT CHANGE UP
HBV SURFACE AG SER-ACNC: SIGNIFICANT CHANGE UP
HCT VFR BLD CALC: 37.9 % — SIGNIFICANT CHANGE UP (ref 34.5–45)
HCV AB S/CO SERPL IA: 0.21 S/CO — SIGNIFICANT CHANGE UP (ref 0–0.99)
HCV AB SERPL-IMP: SIGNIFICANT CHANGE UP
HGB BLD-MCNC: 12.5 G/DL — SIGNIFICANT CHANGE UP (ref 11.5–15.5)
MCHC RBC-ENTMCNC: 32.6 PG — SIGNIFICANT CHANGE UP (ref 27–34)
MCHC RBC-ENTMCNC: 33 G/DL — SIGNIFICANT CHANGE UP (ref 32–36)
MCV RBC AUTO: 98.7 FL — SIGNIFICANT CHANGE UP (ref 80–100)
NRBC # BLD: 0 /100 WBCS — SIGNIFICANT CHANGE UP (ref 0–0)
PLATELET # BLD AUTO: 225 K/UL — SIGNIFICANT CHANGE UP (ref 150–400)
POTASSIUM SERPL-MCNC: 3.6 MMOL/L — SIGNIFICANT CHANGE UP (ref 3.5–5.3)
POTASSIUM SERPL-SCNC: 3.6 MMOL/L — SIGNIFICANT CHANGE UP (ref 3.5–5.3)
PROT SERPL-MCNC: 6.3 G/DL — SIGNIFICANT CHANGE UP (ref 6–8.3)
RBC # BLD: 3.84 M/UL — SIGNIFICANT CHANGE UP (ref 3.8–5.2)
RBC # FLD: 12.9 % — SIGNIFICANT CHANGE UP (ref 10.3–14.5)
SODIUM SERPL-SCNC: 141 MMOL/L — SIGNIFICANT CHANGE UP (ref 135–145)
WBC # BLD: 6.07 K/UL — SIGNIFICANT CHANGE UP (ref 3.8–10.5)
WBC # FLD AUTO: 6.07 K/UL — SIGNIFICANT CHANGE UP (ref 3.8–10.5)

## 2024-12-12 PROCEDURE — 88304 TISSUE EXAM BY PATHOLOGIST: CPT | Mod: 26

## 2024-12-12 DEVICE — LIGATING CLIPS WECK HEMOLOK POLYMER MEDIUM-LARGE (GREEN) 6: Type: IMPLANTABLE DEVICE | Status: FUNCTIONAL

## 2024-12-12 RX ORDER — PANTOPRAZOLE SODIUM 40 MG/1
40 TABLET, DELAYED RELEASE ORAL
Refills: 0 | Status: DISCONTINUED | OUTPATIENT
Start: 2024-12-12 | End: 2024-12-13

## 2024-12-12 RX ORDER — ONDANSETRON HYDROCHLORIDE 4 MG/1
4 TABLET, FILM COATED ORAL EVERY 6 HOURS
Refills: 0 | Status: DISCONTINUED | OUTPATIENT
Start: 2024-12-12 | End: 2024-12-13

## 2024-12-12 RX ORDER — CEFAZOLIN SODIUM 10 G
2000 VIAL (EA) INJECTION ONCE
Refills: 0 | Status: DISCONTINUED | OUTPATIENT
Start: 2024-12-12 | End: 2024-12-12

## 2024-12-12 RX ORDER — ONDANSETRON HYDROCHLORIDE 4 MG/1
4 TABLET, FILM COATED ORAL EVERY 8 HOURS
Refills: 0 | Status: DISCONTINUED | OUTPATIENT
Start: 2024-12-12 | End: 2024-12-12

## 2024-12-12 RX ORDER — DIMENHYDRINATE 50 MG
25 TABLET, EXTENDED RELEASE ORAL ONCE
Refills: 0 | Status: DISCONTINUED | OUTPATIENT
Start: 2024-12-12 | End: 2024-12-12

## 2024-12-12 RX ORDER — IBUPROFEN 200 MG
1 TABLET ORAL
Qty: 0 | Refills: 0 | DISCHARGE
Start: 2024-12-12

## 2024-12-12 RX ORDER — INDOCYANINE GREEN AND WATER 25 MG
5 KIT INJECTION ONCE
Refills: 0 | Status: COMPLETED | OUTPATIENT
Start: 2024-12-12 | End: 2024-12-12

## 2024-12-12 RX ORDER — HYDROMORPHONE HYDROCHLORIDE 2 MG/1
0.5 TABLET ORAL
Refills: 0 | Status: DISCONTINUED | OUTPATIENT
Start: 2024-12-12 | End: 2024-12-12

## 2024-12-12 RX ORDER — OXYCODONE HYDROCHLORIDE 30 MG/1
5 TABLET ORAL EVERY 4 HOURS
Refills: 0 | Status: DISCONTINUED | OUTPATIENT
Start: 2024-12-12 | End: 2024-12-13

## 2024-12-12 RX ORDER — IBUPROFEN 200 MG
600 TABLET ORAL EVERY 6 HOURS
Refills: 0 | Status: DISCONTINUED | OUTPATIENT
Start: 2024-12-12 | End: 2024-12-13

## 2024-12-12 RX ORDER — LEVOTHYROXINE SODIUM 150 MCG
125 TABLET ORAL DAILY
Refills: 0 | Status: DISCONTINUED | OUTPATIENT
Start: 2024-12-12 | End: 2024-12-13

## 2024-12-12 RX ORDER — ONDANSETRON HYDROCHLORIDE 4 MG/1
4 TABLET, FILM COATED ORAL ONCE
Refills: 0 | Status: COMPLETED | OUTPATIENT
Start: 2024-12-12 | End: 2024-12-12

## 2024-12-12 RX ORDER — 0.9 % SODIUM CHLORIDE 0.9 %
1000 INTRAVENOUS SOLUTION INTRAVENOUS
Refills: 0 | Status: DISCONTINUED | OUTPATIENT
Start: 2024-12-12 | End: 2024-12-12

## 2024-12-12 RX ORDER — ACETAMINOPHEN 500MG 500 MG/1
1000 TABLET, COATED ORAL EVERY 6 HOURS
Refills: 0 | Status: DISCONTINUED | OUTPATIENT
Start: 2024-12-12 | End: 2024-12-13

## 2024-12-12 RX ADMIN — ONDANSETRON HYDROCHLORIDE 4 MILLIGRAM(S): 4 TABLET, FILM COATED ORAL at 18:07

## 2024-12-12 RX ADMIN — HYDROMORPHONE HYDROCHLORIDE 0.5 MILLIGRAM(S): 2 TABLET ORAL at 17:47

## 2024-12-12 RX ADMIN — INDOCYANINE GREEN AND WATER 5 MILLIGRAM(S): KIT at 12:13

## 2024-12-12 RX ADMIN — OXYCODONE HYDROCHLORIDE 5 MILLIGRAM(S): 30 TABLET ORAL at 22:54

## 2024-12-12 RX ADMIN — Medication 100 MILLILITER(S): at 05:45

## 2024-12-12 RX ADMIN — HYDROMORPHONE HYDROCHLORIDE 0.5 MILLIGRAM(S): 2 TABLET ORAL at 17:45

## 2024-12-12 RX ADMIN — ONDANSETRON HYDROCHLORIDE 4 MILLIGRAM(S): 4 TABLET, FILM COATED ORAL at 20:29

## 2024-12-12 RX ADMIN — HYDROMORPHONE HYDROCHLORIDE 0.5 MILLIGRAM(S): 2 TABLET ORAL at 18:02

## 2024-12-12 RX ADMIN — Medication 75 MILLILITER(S): at 17:30

## 2024-12-12 RX ADMIN — OXYCODONE HYDROCHLORIDE 5 MILLIGRAM(S): 30 TABLET ORAL at 23:30

## 2024-12-12 RX ADMIN — HYDROMORPHONE HYDROCHLORIDE 0.5 MILLIGRAM(S): 2 TABLET ORAL at 17:30

## 2024-12-12 RX ADMIN — Medication 125 MICROGRAM(S): at 05:43

## 2024-12-12 RX ADMIN — HYDROMORPHONE HYDROCHLORIDE 0.5 MILLIGRAM(S): 2 TABLET ORAL at 18:22

## 2024-12-12 RX ADMIN — HYDROMORPHONE HYDROCHLORIDE 0.5 MILLIGRAM(S): 2 TABLET ORAL at 18:07

## 2024-12-12 NOTE — DISCHARGE NOTE PROVIDER - HOSPITAL COURSE
64-year-old female with history of hypothyroidism presents with epigastric pain, nausea, and vomiting the past 5 days.   elevated transaminases, with elevated bilis in outpt labs  US abd- cholelithiasis, no biliary dilation, CT abd - no acute pathology    #Abd pain, elevated LFTs  +cholelithiasis on US  r/o choledocholithiasis-likely passed stone  MRCP +cholelithiasis  GI and surgery consulted  sp robotic assisted cholecystectomy , tolerated procedure well  postop tolerated diet well  dc  planning outpt pcp, surgery and gi fu        LABS:                        12.5   6.07  )-----------( 225      ( 12 Dec 2024 07:10 )             37.9     12-12    141  |  109[H]  |  12  ----------------------------<  117[H]  3.6   |  26  |  0.84    Ca    8.5      12 Dec 2024 07:10    TPro  6.3  /  Alb  2.9[L]  /  TBili  0.6  /  DBili  x   /  AST  86[H]  /  ALT  629[H]  /  AlkPhos  113  12-12    PT/INR - ( 11 Dec 2024 13:41 )   PT: 11.2 sec;   INR: 0.96 ratio        MR MRCP w/wo IV Cont (12.11.24 @ 19:20) >  1. Hepatic steatosis.  2. Cholelithiasis without evidence of cholecystitis.      US Abdomen Upper Quadrant Right (12.11.24 @ 14:57) >  Cholelithiasis without secondary signs of acute cholecystitis or biliary   dilatation.  Fatty liver.

## 2024-12-12 NOTE — DISCHARGE NOTE PROVIDER - PROVIDER TOKENS
PROVIDER:[TOKEN:[[id number]],FOLLOWUP:[2 weeks]],PROVIDER:[TOKEN:[[id number]],FOLLOWUP:[2 weeks]],PROVIDER:[TOKEN:[[id number]],FOLLOWUP:[2 weeks]]

## 2024-12-12 NOTE — PRE-OP CHECKLIST - AS TEMP SITE
Patient stabilized after being administered STAT IM doses of Haldol 5mg/ml and Benadryl 50mg/ml. Patient verbalized that he heard his mother's voice and was told to join her.
oral

## 2024-12-12 NOTE — DISCHARGE NOTE PROVIDER - CARE PROVIDER_API CALL
Tadeo Gonzalez  Internal Medicine  [address], [place] [address]  Phone: [phone]  Fax: [phone]  Follow Up Time: 2 weeks    Paresh Pierre  Gastroenterology  [address] [place] [address]  Phone: [phone]  Fax: [phone]  Follow Up Time: 2 weeks    Ziggy Bailey [place]  Surgery  [address], [address]  [place] [address]  Phone: [phone]  Fax: [phone]  Follow Up Time: 2 weeks

## 2024-12-12 NOTE — DISCHARGE NOTE PROVIDER - NSDCMRMEDTOKEN_GEN_ALL_CORE_FT
ibuprofen 600 mg oral tablet: 1 tab(s) orally every 6 hours As needed Moderate Pain (4 - 6)  omeprazole 20 mg oral delayed release tablet: 1 tab(s) orally once a day  Synthroid 125 mcg (0.125 mg) oral tablet: 1 tab(s) orally once a day   acetaminophen 500 mg oral tablet: 2 tab(s) orally every 6 hours Alternate with Ibuprofen  Colace 100 mg oral capsule: 1 cap(s) orally 2 times a day  omeprazole 20 mg oral delayed release tablet: 1 tab(s) orally once a day  oxyCODONE 5 mg oral tablet: 1 tab(s) orally every 6 hours as needed for Severe Pain (7 - 10) MDD: 3  Synthroid 125 mcg (0.125 mg) oral tablet: 1 tab(s) orally once a day   acetaminophen 500 mg oral tablet: 2 tab(s) orally every 6 hours Alternate with Ibuprofen  Colace 100 mg oral capsule: 1 cap(s) orally 2 times a day as needed for  constipation  ibuprofen 600 mg oral tablet: 1 tab(s) orally every 6 hours as needed for  mild pain Alternate with Tylenol  omeprazole 20 mg oral delayed release tablet: 1 tab(s) orally once a day  oxyCODONE 5 mg oral tablet: 1 tab(s) orally every 6 hours as needed for Severe Pain (7 - 10) MDD: 4  Synthroid 125 mcg (0.125 mg) oral tablet: 1 tab(s) orally once a day

## 2024-12-12 NOTE — DISCHARGE NOTE PROVIDER - NSDCFUADDINST_GEN_ALL_CORE_FT
Keep glue clean, dry and intact x 24 hrs. Apply water proof ice pack 20 mins on, 20 mins off to help decrease pain and swelling. You may begin showering as usual but Do NOT pick glue. Do not scrub or soak incision site. Pat dry. NO tub baths, NO swimming pools. No hot tubs.  Do not lift anything over 10 lbs.  Take frequent walks.  You may take over the counter stool softener such as colace as needed for constipation while taking pain medication.  For pain after surgery, take the followin. 600mg prescription ibuprofen - take 1 pill every 6 hours with food regularly for the first 3 days after surgery, then as needed  2. 500mg Extra-Strength Tylenol - take 2 pills every 6 hours regularly for the first 3 days after surgery, then as needed.  DO NOT EXCEED 4,000MG OF ACETAMINOPHEN PER DAY.  3. If you still have pain despite the ibuprofen/tylenol combination, then take the oxycodone as prescribed.  Take colace as prescribed while taking narcotic pain medication to prevent constipation.    DO NOT DRIVE WHILE TAKING NARCOTIC PAIN MEDICATION.

## 2024-12-12 NOTE — CARE COORDINATION ASSESSMENT. - MET/SPOKE WITH
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 Wound cultures revealed polymicrobial growth including MRSA, pseudomonas, and anaerobes. Pathology reveals necrosis at resection border suggesting residual osteo in the remaining tarsal  # DM, insulin dependent with A1c >10 and persistent hyperglycemia  #Prior R BKA, very slowly healing  # PAD s/p LLE angio with L RAJNI stent on 10/11/18  # Pancreatic insufficiency s/p prior Whipple  # Hyperkalemia. Likely multifactorial, and influenced by her blood glucose levels. Also likely consuming supratherapeutic dietary K  # pressure ulcer, L heel; recent pressure injury of R hip that has resolved    Recommendations:  - stop cefepime and vanco as she has received 8 weeks and while there is a persistent small area of opening/scant drainage I do not believe she will derive additional benefit from prolonged parenteral therapy at this point. I did  her that she has PVD and it may be that Iv therapy was temporizing an infection that will inevitably flare after Iv therapy is stopped - for obvious reasons we both hope this is not the case.   Given positive margins and ongoing scant drainage will institute doxycycline 100BID. This will target principally MRSA because this is likely to be the most persistent of the bacteria that were cultured from her initial wound.  - Glucose control is critical  - she needs to continue to off-load, which is difficult given her R BKA. She fortunately has additional nursing aides coming in to her home to assist her. Her candidacy for a R BKA prosthesis is complicated by her ongoing L foot wound.  -continue 2x/week (at minimum) wound care for pressure ulcer (L heel) and scant incisional drainage  -f/u in 2 months    It is a pleasure to participate in Ms. Pak's care. Visit length 25 min, >50%  clinical counseling/care coordination    Marily Ahumada MD   of Medicine, Division of Infectious Diseases  [facility] [phone]          Interval History:   Ms Pak is known to me from her recent admission for SIRS related to L great toe septic arthritis with osteo requiring 1st and 2nd toe amputations. Please see above for details. She is changing her dressing ~1x/day and noting only scant drainage. Her R trochanter pressure ulcer has healed; she continues to have a pressure ulcer on her L heel. She has wound care 2x/week.           Physical Exam:   BP (!) 151/91 (BP Location: Left arm, Patient Position: Sitting, Cuff Size: Adult Regular)   Pulse 90   Temp 98  F (36.7  C) (Oral)   SpO2 94%     Exam:  GENERAL:  well-developed, in good spirits, no apparent distress  ENT:  Head is normocephalic, atraumatic. .  EYES:  Eyes have anicteric sclerae.    NECK:  Supple.  LUNGS:  Clear to auscultation.  CARDIOVASCULAR:  2/6 systolic murmur at left sternal border.  EXT:L foot examined and the suture line is well appearing. There is a ~1mm opening at the proximal incisional aspect and minimal drainage and no fluctuance. There is a presure ulcer on her L heel with an area of eschar and no palpable fluctuance. R BKA stump with a small area of callus and no drainage. R pressure ulcers on trochanter has healed.         Laboratory Data:   Microbiology:         Culture Micro   Date Value Ref Range Status   10/13/2018 No growth  Final   10/13/2018 No growth  Final   10/08/2018 No growth  Final   10/07/2018 No growth  Final   10/07/2018 No growth  Final   10/05/2018 Heavy growth  Pseudomonas aeruginosa   (A)  Final   10/05/2018 Heavy growth  Escherichia coli   (A)  Final   10/05/2018 (A)  Final    Light growth  Raoultella ornithinolytica/planticola     10/05/2018 Moderate growth  Enterococcus faecalis   (A)  Final   10/05/2018 (A)  Final    Moderate growth  Methicillin resistant Staphylococcus aureus (MRSA)      10/05/2018 (A)  Final    Heavy growth  Corynebacterium striatum  Identification obtained by MALDI-TOF mass spectrometry research use only database. Test   characteristics determined and verified by the [facility]   ([facility]) [place], [place].  Susceptibility testing not routinely done     10/05/2018 Heavy growth  Streptococcus anginosus   (A)  Final   10/05/2018 (A)  Final    Heavy growth  Prevotella species  Identification obtained by MALDI-TOF mass spectrometry research use only database. Test   characteristics determined and verified by the [facility]   ([facility]) [place], [place].  Beta lactamase positive  Susceptibility testing not routinely done     10/05/2018 (A)  Final    Heavy growth  Fusobacterium nucleatum  Susceptibility testing not routinely done     10/05/2018 (A)  Final    Plus  Heavy growth  Mixed aerobic and anaerobic jim     06/29/2018 No growth  Final   06/29/2018 No growth  Final   06/28/2018 No anaerobes isolated  Final   06/28/2018 No growth  Final   06/28/2018 No anaerobes isolated  Final   06/28/2018 (A)  Final    Light growth  Gram positive cocci  Organism failed to thrive for identification and suceptibility testing     06/13/2018 No growth  Final   06/13/2018 No growth  Final   06/11/2018 Light growth  Candida glabrata   (A)  Final   06/11/2018 Susceptibility testing not routinely done  Final   06/10/2018 No growth  Final   06/10/2018 No growth  Final   06/09/2018 No growth  Final   06/09/2018 Moderate growth  Enterococcus faecalis   (A)  Final   06/09/2018 (A)  Final    Light growth  Streptococcus mitis group  Susceptibility testing not routinely done     06/07/2018 No growth  Final   06/06/2018 No growth  Final   06/06/2018 No growth  Final   02/19/2018 No growth  Final   02/19/2018 No growth  Final   02/19/2018   Final    Canceled, Test credited  Incorrectly ordered by PCU/Clinic  Test reordered as correct code  Tissue culture      02/19/2018   Final    No anaerobes isolated  Since this specimen was not transported in the proper anaerobic transport media, the   absence of anaerobes in this culture does not rule out the presence of anaerobes in this   specimen.     02/19/2018 (A)  Final    Light growth  Methicillin resistant Staphylococcus aureus (MRSA)     02/19/2018 (A)  Final    Light growth  Coagulase negative Staphylococcus  Susceptibility testing not routinely done  This organism is part of normal jim, but on occasion, may be a true pathogen. Clinical   correlation must be applied to interpreting this microbiology result.     02/19/2018   Final    Critical Value/Significant Value called to and read back by  NESHA HORTON RN (7A).  02.21.18 2206 GJS     02/18/2018 No growth  Final   02/18/2018 No growth  Final   02/16/2018   Final    <10,000 colonies/mL  mixed urogenital jim  Susceptibility testing not routinely done     02/16/2018 No growth  Final   02/16/2018 No growth  Final   04/06/2016 (A)  Final    Heavy growth Staphylococcus aureus  Heavy growth Beta hemolytic Streptococcus group B This isolate DOES NOT   demonstrate inducible clindamycin resistance in vitro. Clindamycin is   susceptible and could be used when indicated, however, erythromycin is   resistant and should not be used.     06/24/2015   Final    <10,000 colonies/mL mixed urogenital jim Susceptibility testing not routinely   done     02/19/2015   Final    Culture negative for acid fast bacilli  Assayed at [facility] [address]     02/19/2015 (A)  Final    Normal respiratory jim  Light growth Candida albicans / dubliniensis Candida albicans and Candida   dubliniensis are not routinely speciated Susceptibility testing not routinely   done     02/19/2015 (A)  Final    Candida tropicalis isolated  Candida glabrata isolated  No additional fungi cultured after 4 weeks incubation     02/19/2015 No growth after 4 weeks  Final   02/16/2015 (A)   Final    Canceled, Test credited  >10 Squamous epithelial cells/low power field indicates oral contamination.   Please recollect.  Called to Cari on 4E, 2/16/15 14:10 CWi     02/15/2015 No growth  Final   02/15/2015 No growth  Final   02/13/2015 No growth  Final   02/13/2015 No growth  Final   10/31/2014 No growth  Final   10/29/2014   Final    Culture negative for acid fast bacilli  Assayed at [facility] [address]     10/29/2014 No growth  Final   10/29/2014 Culture negative after 4 weeks  Final   10/29/2014 No growth after 4 weeks  Final   10/29/2014 (A)  Final    Light growth Staphylococcus aureus  Light growth Cristela albicans / dubliniensis Candida albicans and Candida   dubliniensis are not routinely speciated     10/28/2014 No growth  Final   10/28/2014 No growth  Final   11/23/2009   Final    <10,000 colonies/mL Beta hemolytic Streptococcus group B     Comment:     10 to 50,000 colonies/mL Mixed gram positive jim Multiple species present,   probable perineal contamination.  Clindamycin and Erythromycin are not routinely prescribed for isolates from   the urinary tract.   03/04/2005   Final    <10,000 colonies/mL Staphylococcus species Susceptibility testing not routinely     Comment:      done           Other Laboratory Data:    Urine Studies    Recent Labs   Lab Test 10/08/18  0032 06/07/18  0031 02/16/18  2036 06/24/15  1612 02/14/15  1545   LEUKEST Small* Small* Large* Moderate* Duplicate request  SEE ACCN [id number]  *  Trace*   WBCU 7* 0 60* 2 0       Inflammatory Markers    Recent Labs   Lab Test 10/10/18  0605 10/05/18  1459 08/28/18  1325 08/23/18  1310 08/14/18  1340 08/08/18  1115 08/02/18  1215 07/30/18  1245  02/15/15  0240   SED  --  118*  --   --   --  46* 86* 109*  --  132*   .0* 292.0* 52.1* 59.1* 76.7* 69.4* 70.9* 34.3*   < >  --     < > = values in this interval not displayed.       Hematology Studies    Recent Labs   Lab Test 12/05/18  0421 10/14/18  0526  10/13/18  0524 10/12/18  0704 10/11/18  0829 10/10/18  0605  10/07/18  1749  08/28/18  1325 08/23/18  1310 08/14/18  1340 07/28/18  1539   WBC 7.2 15.5* 13.5* 16.1* 18.9* 24.9*   < > 24.8*   < > 7.4 8.1 5.9 7.5   ANEU 3.0  --   --   --   --   --   --  20.5*  --  3.8 3.9 3.1 3.3   AEOS 0.3  --   --   --   --   --   --  0.1  --  0.2 0.2 0.2 0.3   HGB 11.5* 7.8* 8.3* 8.0* 8.2* 8.8*   < > 10.0*   < > 10.5* 10.4* 10.2* 12.2   MCV 85 80 78 78 78 77*   < > 81   < > 83 82 83 85    428 358 377 328 361   < > 360   < > 273 301 243 215    < > = values in this interval not displayed.       Immune Globulin Studies    Recent Labs   Lab Test 02/21/15  0652 02/15/15  1520   IGG 1,330  --    IGE  --  46       Metabolic Studies     Recent Labs   Lab Test 12/05/18  0421 10/18/18 10/14/18  0526 10/13/18  0524 10/12/18  0704 10/11/18  0829     --  141 138 139 138   POTASSIUM 4.6  --  4.2 4.0 4.3 4.3   CHLORIDE 106  --  110* 109 110* 111*   CO2 26  --  23 22 23 21   BUN 28  --  22 24 24 30   CR 1.02 1.68* 1.21* 1.29* 1.28* 1.20*   GFRESTIMATED 57* 32* 47* 44* 44* 47*       Hepatic Studies    Recent Labs   Lab Test 12/05/18  0421 10/12/18  0704 10/10/18  0605 08/28/18  1325 08/23/18  1310 08/14/18  1340   BILITOTAL 0.3 0.3 0.3 0.3 0.2 0.2   ALKPHOS 157* 149 152* 263* 283* 354*   ALBUMIN 2.9* 1.5* 1.6* 2.8* 2.8* 2.5*   AST 29 12 12 20 24 35   ALT 32 14 20 33 42 46       Thyroid Studies    Recent Labs   Lab Test 01/13/17  1353 11/02/16  1359   TSH 0.32* 0.34*   T4 1.06 1.02       Vancomycin Levels    Recent Labs   Lab Test 10/15/18  0054 10/10/18  2230 08/08/18  1115   VANCOMYCIN 27.0* 20.7 20.8        patient/spouse

## 2024-12-12 NOTE — DISCHARGE NOTE PROVIDER - CARE PROVIDERS DIRECT ADDRESSES
,DirectAddress_Unknown,DirectAddress_Unknown,[email or web address]

## 2024-12-12 NOTE — CARE COORDINATION ASSESSMENT. - OTHER PERTINENT REFERRAL INFORMATION
CM met with patient and spouse Lane at bedside to explain role and transitions of care. Patient lives with spouse in a private house with 5 steps to get in, 12 to the basement and 12 to the second floor.  Patient was fully independent prior to admission.  No caregiver identified, no home care or DMEs.  Spouse will transport patient home when ready to be discharge.  No needs identified. Patient and spouse verbalized understanding of plans after discharge and are in agreement.  Resource folder left at bedside.  All questions answered to the best of my abilities.  CM remains available throughout the hospital stay.

## 2024-12-12 NOTE — CHART NOTE - NSCHARTNOTEFT_GEN_A_CORE
Surgery Post-Op Note    Procedure: Robotic assisted cholecystectomy    Surgeon: Dr. Bailey    S: Pt reports having some nausea and incisional pain after procedure. Reports having some liquids in PACU which patient tolerated. Denies CP, SOB, vomiting or any other complaints at this time.    O:  T(C): 36.5 (12-12-24 @ 18:02), Max: 36.5 (12-12-24 @ 17:19)  T(F): 97.7 (12-12-24 @ 18:02), Max: 97.7 (12-12-24 @ 17:19)  HR: 67 (12-12-24 @ 18:37) (60 - 70)  BP: 134/77 (12-12-24 @ 18:37) (134/77 - 156/85)  RR: 13 (12-12-24 @ 18:37) (10 - 15)  SpO2: 95% (12-12-24 @ 18:37) (95% - 100%)                          12.5   6.07  )-----------( 225      ( 12 Dec 2024 07:10 )             37.9     12-12    141  |  109[H]  |  12  ----------------------------<  117[H]  3.6   |  26  |  0.84    Ca    8.5      12 Dec 2024 07:10    TPro  6.3  /  Alb  2.9[L]  /  TBili  0.6  /  DBili  x   /  AST  86[H]  /  ALT  629[H]  /  AlkPhos  113  12-12      Gen: NAD, resting comfortably in bed  C/V: NSR  Pulm: Nonlabored breathing  Abd: soft, incisional tenderness, wounds clean and dry      A/P: 64yFemale s/p robotic cholecystectomy    May start regular diet as tolerated  Pain/nausea control  DVT ppx  Cleared from surgical standpoint for d/c, to follow up with Dr. Bailey in one week Surgery Post-Op Note    Procedure: Robotic assisted cholecystectomy    Surgeon: Dr. Bailey    S: Pt reports having some nausea and incisional pain after procedure. Reports having some liquids in PACU which patient tolerated. Denies CP, SOB, vomiting or any other complaints at this time.    O:  T(C): 36.5 (12-12-24 @ 18:02), Max: 36.5 (12-12-24 @ 17:19)  T(F): 97.7 (12-12-24 @ 18:02), Max: 97.7 (12-12-24 @ 17:19)  HR: 67 (12-12-24 @ 18:37) (60 - 70)  BP: 134/77 (12-12-24 @ 18:37) (134/77 - 156/85)  RR: 13 (12-12-24 @ 18:37) (10 - 15)  SpO2: 95% (12-12-24 @ 18:37) (95% - 100%)                          12.5   6.07  )-----------( 225      ( 12 Dec 2024 07:10 )             37.9     12-12    141  |  109[H]  |  12  ----------------------------<  117[H]  3.6   |  26  |  0.84    Ca    8.5      12 Dec 2024 07:10    TPro  6.3  /  Alb  2.9[L]  /  TBili  0.6  /  DBili  x   /  AST  86[H]  /  ALT  629[H]  /  AlkPhos  113  12-12      Gen: NAD, resting comfortably in bed  C/V: NSR  Pulm: Nonlabored breathing  Abd: soft, incisional tenderness, wounds clean and dry      A/P: 64yFemale s/p robotic cholecystectomy    May start regular diet as tolerated  Pain/nausea control  DVT ppx  D/c per primary team, to follow up with Dr. Bailey outpatient in one week

## 2024-12-12 NOTE — CARE COORDINATION ASSESSMENT. - NSCAREPROVIDERS_GEN_ALL_CORE_FT
CARE PROVIDERS:  Accepting Physician: Traci Miller  Administration: Tadeo Cook  Administration: Mode Fulton  Admitting: Traci Miller  Attending: Traci Miller  Consultant: Paresh Pierre  Consultant: Naomi Bowens  Consultant: Ziggy Bailey  Consultant: Love Mcdonnell  Consultant: Anthony Alcala  Consultant: Yariel Willson  Consultant: Rosario Peoples  Consultant: Khan, Fahrina  Consultant: Sanaz Parr  Covering Team: Surya Jimenez  ED ACP: Capri Franklin  ED Attending: Moreno Greer  ED Attending2: Sahil Irene  ED Nurse: Dewey Figueroa  Nurse: Cindy Peng  Nurse: Mellisa Tan  Nurse: Karrie Horton  Nurse: Evie Felipe  Nurse: Keshia Youngblood  Nurse: Elyssa Oshea  Ordered: ServiceAccount, SCMMLM  Outpatient Provider: Traci Miller  Override: Michelle Wheeler  Override: Becky Antunez  PCA/Nursing Assistant: Paul East  Primary Team: Elyssa Irwin  Registered Dietitian: Lorie Ramirez

## 2024-12-12 NOTE — BRIEF OPERATIVE NOTE - NSICDXBRIEFPROCEDURE_GEN_ALL_CORE_FT
PROCEDURES:  Robot-assisted cholecystectomy with cholangiography 12-Dec-2024 17:17:41  Ziggy Bailey

## 2024-12-12 NOTE — DISCHARGE NOTE PROVIDER - NSDCCPCAREPLAN_GEN_ALL_CORE_FT
PRINCIPAL DISCHARGE DIAGNOSIS  Diagnosis: Transaminitis  Assessment and Plan of Treatment: 64-year-old female with history of hypothyroidism presents with epigastric pain, nausea, and vomiting the past 5 days.   elevated transaminases, with elevated bilis in outpt labs  US abd- cholelithiasis, no biliary dilation, CT abd - no acute pathology  #Abd pain, elevated LFTs  +cholelithiasis on US  r/o choledocholithiasis-likely passed stone  MRCP +cholelithiasis  GI and surgery consulted  sp robotic assisted cholecystectomy , tolerated procedure well  postop tolerated diet well  pain control  dc  planning outpt pcp, surgery and gi fu  LABS:                        12.5   6.07  )-----------( 225      ( 12 Dec 2024 07:10 )             37.9   12-12  141  |  109[H]  |  12  ----------------------------<  117[H]  3.6   |  26  |  0.84  Ca    8.5      12 Dec 2024 07:10  TPro  6.3  /  Alb  2.9[L]  /  TBili  0.6  /  DBili  x   /  AST  86[H]  /  ALT  629[H]  /  AlkPhos  113  12-12  PT/INR - ( 11 Dec 2024 13:41 )   PT: 11.2 sec;   INR: 0.96 ratio    MR MRCP w/wo IV Cont (12.11.24 @ 19:20) >  1. Hepatic steatosis.  2. Cholelithiasis without evidence of cholecystitis.  US Abdomen Upper Quadrant Right (12.11.24 @ 14:57) >  Cholelithiasis without secondary signs of acute cholecystitis or biliary   dilatation.  Fatty liver.

## 2024-12-13 ENCOUNTER — TRANSCRIPTION ENCOUNTER (OUTPATIENT)
Age: 64
End: 2024-12-13

## 2024-12-13 VITALS
HEART RATE: 67 BPM | DIASTOLIC BLOOD PRESSURE: 76 MMHG | TEMPERATURE: 98 F | SYSTOLIC BLOOD PRESSURE: 123 MMHG | RESPIRATION RATE: 18 BRPM | OXYGEN SATURATION: 97 %

## 2024-12-13 LAB
ALBUMIN SERPL ELPH-MCNC: 3.1 G/DL — LOW (ref 3.3–5)
ALBUMIN SERPL ELPH-MCNC: 3.2 G/DL — LOW (ref 3.3–5)
ALP SERPL-CCNC: 114 U/L — SIGNIFICANT CHANGE UP (ref 40–120)
ALP SERPL-CCNC: 114 U/L — SIGNIFICANT CHANGE UP (ref 40–120)
ALT FLD-CCNC: 487 U/L — HIGH (ref 12–78)
ALT FLD-CCNC: 507 U/L — HIGH (ref 12–78)
ANION GAP SERPL CALC-SCNC: 7 MMOL/L — SIGNIFICANT CHANGE UP (ref 5–17)
ANION GAP SERPL CALC-SCNC: 8 MMOL/L — SIGNIFICANT CHANGE UP (ref 5–17)
AST SERPL-CCNC: 71 U/L — HIGH (ref 15–37)
AST SERPL-CCNC: 75 U/L — HIGH (ref 15–37)
BILIRUB SERPL-MCNC: 0.4 MG/DL — SIGNIFICANT CHANGE UP (ref 0.2–1.2)
BILIRUB SERPL-MCNC: 0.5 MG/DL — SIGNIFICANT CHANGE UP (ref 0.2–1.2)
BUN SERPL-MCNC: 7 MG/DL — SIGNIFICANT CHANGE UP (ref 7–23)
BUN SERPL-MCNC: 7 MG/DL — SIGNIFICANT CHANGE UP (ref 7–23)
CALCIUM SERPL-MCNC: 9.2 MG/DL — SIGNIFICANT CHANGE UP (ref 8.5–10.1)
CALCIUM SERPL-MCNC: 9.3 MG/DL — SIGNIFICANT CHANGE UP (ref 8.5–10.1)
CHLORIDE SERPL-SCNC: 106 MMOL/L — SIGNIFICANT CHANGE UP (ref 96–108)
CHLORIDE SERPL-SCNC: 110 MMOL/L — HIGH (ref 96–108)
CO2 SERPL-SCNC: 24 MMOL/L — SIGNIFICANT CHANGE UP (ref 22–31)
CO2 SERPL-SCNC: 26 MMOL/L — SIGNIFICANT CHANGE UP (ref 22–31)
CREAT SERPL-MCNC: 0.71 MG/DL — SIGNIFICANT CHANGE UP (ref 0.5–1.3)
CREAT SERPL-MCNC: 1 MG/DL — SIGNIFICANT CHANGE UP (ref 0.5–1.3)
EGFR: 64 ML/MIN/1.73M2 — SIGNIFICANT CHANGE UP
EGFR: 96 ML/MIN/1.73M2 — SIGNIFICANT CHANGE UP
GLUCOSE SERPL-MCNC: 149 MG/DL — HIGH (ref 70–99)
GLUCOSE SERPL-MCNC: 99 MG/DL — SIGNIFICANT CHANGE UP (ref 70–99)
HCT VFR BLD CALC: 38.1 % — SIGNIFICANT CHANGE UP (ref 34.5–45)
HCT VFR BLD CALC: 39.8 % — SIGNIFICANT CHANGE UP (ref 34.5–45)
HGB BLD-MCNC: 12.7 G/DL — SIGNIFICANT CHANGE UP (ref 11.5–15.5)
HGB BLD-MCNC: 13.1 G/DL — SIGNIFICANT CHANGE UP (ref 11.5–15.5)
MCHC RBC-ENTMCNC: 32.3 PG — SIGNIFICANT CHANGE UP (ref 27–34)
MCHC RBC-ENTMCNC: 32.5 PG — SIGNIFICANT CHANGE UP (ref 27–34)
MCHC RBC-ENTMCNC: 32.9 G/DL — SIGNIFICANT CHANGE UP (ref 32–36)
MCHC RBC-ENTMCNC: 33.3 G/DL — SIGNIFICANT CHANGE UP (ref 32–36)
MCV RBC AUTO: 97.4 FL — SIGNIFICANT CHANGE UP (ref 80–100)
MCV RBC AUTO: 98 FL — SIGNIFICANT CHANGE UP (ref 80–100)
NRBC # BLD: 0 /100 WBCS — SIGNIFICANT CHANGE UP (ref 0–0)
NRBC # BLD: 0 /100 WBCS — SIGNIFICANT CHANGE UP (ref 0–0)
PLATELET # BLD AUTO: 216 K/UL — SIGNIFICANT CHANGE UP (ref 150–400)
PLATELET # BLD AUTO: 231 K/UL — SIGNIFICANT CHANGE UP (ref 150–400)
POTASSIUM SERPL-MCNC: 3.6 MMOL/L — SIGNIFICANT CHANGE UP (ref 3.5–5.3)
POTASSIUM SERPL-MCNC: 3.9 MMOL/L — SIGNIFICANT CHANGE UP (ref 3.5–5.3)
POTASSIUM SERPL-SCNC: 3.6 MMOL/L — SIGNIFICANT CHANGE UP (ref 3.5–5.3)
POTASSIUM SERPL-SCNC: 3.9 MMOL/L — SIGNIFICANT CHANGE UP (ref 3.5–5.3)
PROT SERPL-MCNC: 6.9 G/DL — SIGNIFICANT CHANGE UP (ref 6–8.3)
PROT SERPL-MCNC: 7 G/DL — SIGNIFICANT CHANGE UP (ref 6–8.3)
RBC # BLD: 3.91 M/UL — SIGNIFICANT CHANGE UP (ref 3.8–5.2)
RBC # BLD: 4.06 M/UL — SIGNIFICANT CHANGE UP (ref 3.8–5.2)
RBC # FLD: 12.6 % — SIGNIFICANT CHANGE UP (ref 10.3–14.5)
RBC # FLD: 12.8 % — SIGNIFICANT CHANGE UP (ref 10.3–14.5)
SODIUM SERPL-SCNC: 140 MMOL/L — SIGNIFICANT CHANGE UP (ref 135–145)
SODIUM SERPL-SCNC: 141 MMOL/L — SIGNIFICANT CHANGE UP (ref 135–145)
WBC # BLD: 10.68 K/UL — HIGH (ref 3.8–10.5)
WBC # BLD: 11.29 K/UL — HIGH (ref 3.8–10.5)
WBC # FLD AUTO: 10.68 K/UL — HIGH (ref 3.8–10.5)
WBC # FLD AUTO: 11.29 K/UL — HIGH (ref 3.8–10.5)

## 2024-12-13 PROCEDURE — 96374 THER/PROPH/DIAG INJ IV PUSH: CPT

## 2024-12-13 PROCEDURE — 88304 TISSUE EXAM BY PATHOLOGIST: CPT

## 2024-12-13 PROCEDURE — S2900: CPT

## 2024-12-13 PROCEDURE — 74183 MRI ABD W/O CNTR FLWD CNTR: CPT | Mod: MC

## 2024-12-13 PROCEDURE — 86901 BLOOD TYPING SEROLOGIC RH(D): CPT

## 2024-12-13 PROCEDURE — C9399: CPT

## 2024-12-13 PROCEDURE — 87086 URINE CULTURE/COLONY COUNT: CPT

## 2024-12-13 PROCEDURE — 99285 EMERGENCY DEPT VISIT HI MDM: CPT

## 2024-12-13 PROCEDURE — 93005 ELECTROCARDIOGRAM TRACING: CPT

## 2024-12-13 PROCEDURE — A9579: CPT

## 2024-12-13 PROCEDURE — 80053 COMPREHEN METABOLIC PANEL: CPT

## 2024-12-13 PROCEDURE — 74177 CT ABD & PELVIS W/CONTRAST: CPT | Mod: MC

## 2024-12-13 PROCEDURE — 86900 BLOOD TYPING SEROLOGIC ABO: CPT

## 2024-12-13 PROCEDURE — 76705 ECHO EXAM OF ABDOMEN: CPT

## 2024-12-13 PROCEDURE — 85027 COMPLETE CBC AUTOMATED: CPT

## 2024-12-13 PROCEDURE — 85610 PROTHROMBIN TIME: CPT

## 2024-12-13 PROCEDURE — 85730 THROMBOPLASTIN TIME PARTIAL: CPT

## 2024-12-13 PROCEDURE — 81001 URINALYSIS AUTO W/SCOPE: CPT

## 2024-12-13 PROCEDURE — 36415 COLL VENOUS BLD VENIPUNCTURE: CPT

## 2024-12-13 PROCEDURE — 80074 ACUTE HEPATITIS PANEL: CPT

## 2024-12-13 PROCEDURE — C1889: CPT

## 2024-12-13 PROCEDURE — 71045 X-RAY EXAM CHEST 1 VIEW: CPT

## 2024-12-13 PROCEDURE — 86850 RBC ANTIBODY SCREEN: CPT

## 2024-12-13 PROCEDURE — 83690 ASSAY OF LIPASE: CPT

## 2024-12-13 PROCEDURE — 85025 COMPLETE CBC W/AUTO DIFF WBC: CPT

## 2024-12-13 RX ORDER — IBUPROFEN 200 MG
1 TABLET ORAL
Qty: 24 | Refills: 0
Start: 2024-12-13

## 2024-12-13 RX ORDER — DOCUSATE SODIUM 100 MG
1 CAPSULE ORAL
Qty: 28 | Refills: 0
Start: 2024-12-13 | End: 2024-12-26

## 2024-12-13 RX ORDER — IBUPROFEN 200 MG
1 TABLET ORAL
Qty: 56 | Refills: 0
Start: 2024-12-13 | End: 2024-12-26

## 2024-12-13 RX ORDER — ACETAMINOPHEN 500MG 500 MG/1
2 TABLET, COATED ORAL
Qty: 0 | Refills: 0 | DISCHARGE
Start: 2024-12-13

## 2024-12-13 RX ORDER — OXYCODONE HYDROCHLORIDE 30 MG/1
1 TABLET ORAL
Qty: 20 | Refills: 0
Start: 2024-12-13 | End: 2024-12-17

## 2024-12-13 RX ORDER — DOCUSATE SODIUM 100 MG
1 CAPSULE ORAL
Qty: 20 | Refills: 0
Start: 2024-12-13

## 2024-12-13 RX ORDER — OXYCODONE HYDROCHLORIDE 30 MG/1
1 TABLET ORAL
Qty: 5 | Refills: 0
Start: 2024-12-13

## 2024-12-13 RX ADMIN — ONDANSETRON HYDROCHLORIDE 4 MILLIGRAM(S): 4 TABLET, FILM COATED ORAL at 11:02

## 2024-12-13 RX ADMIN — Medication 125 MICROGRAM(S): at 05:29

## 2024-12-13 RX ADMIN — OXYCODONE HYDROCHLORIDE 5 MILLIGRAM(S): 30 TABLET ORAL at 08:36

## 2024-12-13 RX ADMIN — PANTOPRAZOLE SODIUM 40 MILLIGRAM(S): 40 TABLET, DELAYED RELEASE ORAL at 05:29

## 2024-12-13 NOTE — PROGRESS NOTE ADULT - ASSESSMENT
Transaminitis  Abdominal pain  N/V  Hepatic steatosis  Hx PUD    CT and US noted; hepatic steatosis; cholelithiasis no sign of ccy or biliary dilation  Bili and LFTs noted, trending down from outpatient  Pt currently asymptomatic  Suspect passed CBD stone  MRCP negative  PPI 40mg qd  Per surgery eval planned for ccy today  D/w pt and family at bedside    I reviewed the overnight course of events on the unit, re-confirming the patient history. I discussed the care with the patient  Differential diagnosis and plan of care discussed with patient after the evaluation  35 minutes spent on total encounter of which more than fifty percent of the encounter was spent counseling and/or coordinating care by the attending physician.
64-year-old female with history of hypothyroidism presents with epigastric pain, nausea, and vomiting the past 5 days.   elevated transaminases, with elevated bilis in outpt labs  US abd- cholelithiasis, no biliary dilation, CT abd - no acute pathology    #Abd pain, elevated LFTs  +cholelithiasis on US  r/o choledocholithiasis-?passed stone  MRCP +cholelithiasis  GI cs noted  npo, ivf ,iv zofran, pain control  surgery cs noted- for  cholecystectomy today    #+UA- fu UCx,   pt without urinary symptoms  will monitor    #hypothyroidism- resume synthroid    #GI ppx- resume home ppi    #DVt ppx- ambulatory    [facility]   [phone]    
64 year old female a/w r/o acute cholecystitis now s/p tayo lilian    Plan:  - patient doing well post operatively  - dc home today

## 2024-12-13 NOTE — DISCHARGE NOTE NURSING/CASE MANAGEMENT/SOCIAL WORK - PATIENT PORTAL LINK FT
You can access the FollowMyHealth Patient Portal offered by [facility] by registering at the following website: [email or web address]. By joining [employer] [facility]’s FollowMyHealth portal, you will also be able to view your health information using other applications (apps) compatible with our system.

## 2024-12-13 NOTE — DISCHARGE NOTE NURSING/CASE MANAGEMENT/SOCIAL WORK - NSDCPEFALRISK_GEN_ALL_CORE
For information on Fall & Injury Prevention, visit: [email or web address] OR  [email or web address] OR  https://www.cdc.gov/steadi/patient.html

## 2024-12-13 NOTE — PROGRESS NOTE ADULT - NSPROGADDITIONALINFOA_GEN_ALL_CORE
agree with above unless contradicts below  doing well  no pain  MRCP negative  pt likely passed stone  at this time will proceed with robotic lilian,  procedure d/w pt including r/b/a
d/c home

## 2024-12-13 NOTE — DISCHARGE NOTE NURSING/CASE MANAGEMENT/SOCIAL WORK - FINANCIAL ASSISTANCE
[facility] provides services at a reduced cost to those who are determined to be eligible through [facility]’s financial assistance program. Information regarding [facility]’s financial assistance program can be found by going to [email or web address] or by calling [phone].

## 2024-12-13 NOTE — PROGRESS NOTE ADULT - SUBJECTIVE AND OBJECTIVE BOX
Patient is a 64y old  Female who presents with a chief complaint of abdominal pain (12 Dec 2024 11:33)      INTERVAL HPI/OVERNIGHT EVENTS: noted  pt seen and examined this am   events noted  resolved abd pain      Vital Signs Last 24 Hrs  T(C): 36.5 (12 Dec 2024 17:19), Max: 36.9 (12 Dec 2024 14:45)  T(F): 97.7 (12 Dec 2024 17:19), Max: 98.4 (12 Dec 2024 14:45)  HR: 61 (12 Dec 2024 18:02) (61 - 78)  BP: 144/79 (12 Dec 2024 18:02) (113/74 - 156/85)  BP(mean): --  RR: 10 (12 Dec 2024 18:02) (10 - 18)  SpO2: 100% (12 Dec 2024 18:02) (94% - 100%)    Parameters below as of 12 Dec 2024 18:02    O2 Flow (L/min): 3      acetaminophen     Tablet .. 1000 milliGRAM(s) Oral every 6 hours PRN  dextrose 5% + sodium chloride 0.9%. 1000 milliLiter(s) IV Continuous <Continuous>  HYDROmorphone  Injectable 0.5 milliGRAM(s) IV Push every 10 minutes PRN  ibuprofen  Tablet. 600 milliGRAM(s) Oral every 6 hours PRN  influenza   Vaccine 0.5 milliLiter(s) IntraMuscular once  lactated ringers. 1000 milliLiter(s) IV Continuous <Continuous>  levothyroxine 125 MICROGram(s) Oral daily  ondansetron Injectable 4 milliGRAM(s) IV Push every 8 hours PRN  oxyCODONE    IR 5 milliGRAM(s) Oral every 4 hours PRN  pantoprazole    Tablet 40 milliGRAM(s) Oral before breakfast      PHYSICAL EXAM:  GENERAL: NAD,   EYES: conjunctiva and sclera clear  ENMT: Moist mucous membranes  NECK: Supple, No JVD, Normal thyroid  CHEST/LUNG: non labored, cta b/l  HEART: Regular rate and rhythm; No murmurs, rubs, or gallops  ABDOMEN: Soft, Nontender, Nondistended; Bowel sounds present  EXTREMITIES:  2+ Peripheral Pulses, No clubbing, cyanosis, or edema  LYMPH: No lymphadenopathy noted  SKIN: No rashes or lesions    Consultant(s) Notes Reviewed:  [x ] YES  [ ] NO  Care Discussed with Consultants/Other Providers [ x] YES  [ ] NO    LABS:                        12.5   6.07  )-----------( 225      ( 12 Dec 2024 07:10 )             37.9     12-12    141  |  109[H]  |  12  ----------------------------<  117[H]  3.6   |  26  |  0.84    Ca    8.5      12 Dec 2024 07:10    TPro  6.3  /  Alb  2.9[L]  /  TBili  0.6  /  DBili  x   /  AST  86[H]  /  ALT  629[H]  /  AlkPhos  113  12-12    PT/INR - ( 11 Dec 2024 13:41 )   PT: 11.2 sec;   INR: 0.96 ratio         PTT - ( 11 Dec 2024 13:41 )  PTT:31.9 sec  Urinalysis Basic - ( 12 Dec 2024 07:10 )    Color: x / Appearance: x / SG: x / pH: x  Gluc: 117 mg/dL / Ketone: x  / Bili: x / Urobili: x   Blood: x / Protein: x / Nitrite: x   Leuk Esterase: x / RBC: x / WBC x   Sq Epi: x / Non Sq Epi: x / Bacteria: x      CAPILLARY BLOOD GLUCOSE            Urinalysis Basic - ( 12 Dec 2024 07:10 )    Color: x / Appearance: x / SG: x / pH: x  Gluc: 117 mg/dL / Ketone: x  / Bili: x / Urobili: x   Blood: x / Protein: x / Nitrite: x   Leuk Esterase: x / RBC: x / WBC x   Sq Epi: x / Non Sq Epi: x / Bacteria: x        Culture - Urine (collected 11 Dec 2024 13:47)  Source: Clean Catch  Final Report (12 Dec 2024 13:32):    <10,000 CFU/mL Normal Urogenital Yani    Urinalysis with Rflx Culture (collected 11 Dec 2024 13:47)        RADIOLOGY & ADDITIONAL TESTS:    Imaging Personally Reviewed:  [x ] YES  [ ] NO
POD #1: lap lilian    SUBJECTIVE:  Patient seen and examined at bedside this morning, denies any acute complaints, reports that she had some nausea overnight but denies any vomiting, had not yet had a meal. Denies any chest pain or shortness of breath.     MEDICATIONS  (STANDING):  dextrose 5% + sodium chloride 0.9%. 1000 milliLiter(s) (100 mL/Hr) IV Continuous <Continuous>  influenza   Vaccine 0.5 milliLiter(s) IntraMuscular once  levothyroxine 125 MICROGram(s) Oral daily  pantoprazole    Tablet 40 milliGRAM(s) Oral before breakfast    MEDICATIONS  (PRN):  acetaminophen     Tablet .. 1000 milliGRAM(s) Oral every 6 hours PRN Temp greater or equal to 38C (100.4F), Mild Pain (1 - 3)  ibuprofen  Tablet. 600 milliGRAM(s) Oral every 6 hours PRN Moderate Pain (4 - 6)  ondansetron Injectable 4 milliGRAM(s) IV Push every 6 hours PRN Nausea and/or Vomiting  oxyCODONE    IR 5 milliGRAM(s) Oral every 4 hours PRN Severe Pain (7 - 10)      Vital Signs Last 24 Hrs  T(C): 36.4 (13 Dec 2024 05:06), Max: 36.9 (12 Dec 2024 14:45)  T(F): 97.6 (13 Dec 2024 05:06), Max: 98.4 (12 Dec 2024 14:45)  HR: 69 (13 Dec 2024 05:06) (60 - 78)  BP: 126/79 (13 Dec 2024 05:06) (117/73 - 156/85)  BP(mean): --  RR: 18 (13 Dec 2024 05:06) (10 - 18)  SpO2: 96% (13 Dec 2024 05:06) (91% - 100%)    Parameters below as of 13 Dec 2024 05:06  Patient On (Oxygen Delivery Method): room air        PHYSICAL EXAM:  Constitutional: AAOx3, no acute distress  HEENT: NCAT, airway patent  Cardiovascular: RRR, pulses present bilaterally  Respiratory: nonlabored breathing  Gastrointestinal: abdomen soft, nontender, non distended, no rebound or guarding, no palpable masses, incisions are clean, dry and intact without any surrounding erythema, drainage, bleeding or signs of infection   Neuro: no focal deficits  Extremities: non edematous, no calf pain bilaterally         I&O's Detail    12 Dec 2024 07:01  -  13 Dec 2024 07:00  --------------------------------------------------------  IN:    Lactated Ringers: 500 mL    Oral Fluid: 120 mL  Total IN: 620 mL    OUT:    Voided (mL): 400 mL  Total OUT: 400 mL    Total NET: 220 mL          LABS:  [pending]
SURGERY PA NOTE ON BEHALF OF DR. BAILEY:    S: Patient seen and examined at bedside.   No acute events overnight.  patient has no new complaints this AM.  Denies fevers, chills, chest pain, SOB, palpitations, calf pain.      MEDICATIONS:  dextrose 5% + sodium chloride 0.9%. 1000 milliLiter(s) IV Continuous <Continuous>  influenza   Vaccine 0.5 milliLiter(s) IntraMuscular once  levothyroxine 125 MICROGram(s) Oral daily  morphine  - Injectable 2 milliGRAM(s) IV Push every 6 hours PRN  ondansetron Injectable 4 milliGRAM(s) IV Push every 8 hours PRN  pantoprazole    Tablet 40 milliGRAM(s) Oral before breakfast      O:  Vital Signs Last 24 Hrs  T(C): 36.4 (12 Dec 2024 04:47), Max: 36.7 (11 Dec 2024 11:59)  T(F): 97.5 (12 Dec 2024 04:47), Max: 98 (11 Dec 2024 11:59)  HR: 66 (12 Dec 2024 04:47) (66 - 88)  BP: 113/74 (12 Dec 2024 04:47) (113/74 - 125/82)  BP(mean): --  RR: 18 (12 Dec 2024 04:47) (18 - 18)  SpO2: 98% (12 Dec 2024 04:47) (94% - 98%)    Parameters below as of 12 Dec 2024 04:47  Patient On (Oxygen Delivery Method): room air        I&O SUMMARY:      PHYSICAL EXAM:  Lungs: CTA bilat without W/R/R  Card: S1S2  Abd: Soft, NT, ND.  +BS x 4.  No rebound/guarding.    Ext: Calves soft, NT, without edema bilat    LABS:                        14.9   7.75  )-----------( 278      ( 11 Dec 2024 13:41 )             43.8     12-11    137  |  104  |  17  ----------------------------<  112[H]  4.1   |  25  |  0.92    Ca    9.9      11 Dec 2024 13:41    TPro  8.2  /  Alb  3.8  /  TBili  1.0  /  DBili  x   /  AST  181[H]  /  ALT  1058[H]  /  AlkPhos  158[H]  12-11    PT/INR - ( 11 Dec 2024 13:41 )   PT: 11.2 sec;   INR: 0.96 ratio         PTT - ( 11 Dec 2024 13:41 )  PTT:31.9 sec          RADIOLOGY:    Assessment:  64 year old male a/w r/o acute cholecystitis w/ transaminitis, normalized TBili.        Plan:  - f/u MRCP read  - booked for add-on robot assisted lap lilian today 12/12  - Cont Abx  - NPO  - Preop labs  - multimodal analgesia prn  - to discuss w/ Dr. Bailey    
[facility]  Farzad Pond PA-C  [address] [place] [id number]  [phone]      INTERVAL HPI/OVERNIGHT EVENTS:  Pt s/e with  at bedside  MRCP negative for cbd stone, d/w pt  Pt c/o intermittent abd pain    MEDICATIONS  (STANDING):  dextrose 5% + sodium chloride 0.9%. 1000 milliLiter(s) (100 mL/Hr) IV Continuous <Continuous>  influenza   Vaccine 0.5 milliLiter(s) IntraMuscular once  levothyroxine 125 MICROGram(s) Oral daily  pantoprazole    Tablet 40 milliGRAM(s) Oral before breakfast    MEDICATIONS  (PRN):  morphine  - Injectable 2 milliGRAM(s) IV Push every 6 hours PRN Severe Pain (7 - 10)  ondansetron Injectable 4 milliGRAM(s) IV Push every 8 hours PRN Nausea and/or Vomiting      Allergies    Crab (Other)  Lobster (Other)  Nickel (Rash)  sulfa drugs (Rash)      PHYSICAL EXAM:   Vital Signs:  Vital Signs Last 24 Hrs  T(C): 36.4 (12 Dec 2024 11:20), Max: 36.7 (11 Dec 2024 11:59)  T(F): 97.6 (12 Dec 2024 11:20), Max: 98 (11 Dec 2024 11:59)  HR: 68 (12 Dec 2024 11:20) (66 - 88)  BP: 117/73 (12 Dec 2024 11:20) (113/74 - 125/82)  BP(mean): --  RR: 18 (12 Dec 2024 11:20) (18 - 18)  SpO2: 94% (12 Dec 2024 11:20) (94% - 98%)    Parameters below as of 12 Dec 2024 11:20  Patient On (Oxygen Delivery Method): room air      Daily Height in cm: 165.1 (11 Dec 2024 11:59)    Daily Weight in k.2 (12 Dec 2024 04:47)    GENERAL:  Appears stated age  HEENT:  NC/AT  CHEST:  Full & symmetric excursion  HEART:  Regular rhythm  ABDOMEN:  Soft, non-tender, non-distended  EXTEREMITIES:  no cyanosis  SKIN:  No rash  NEURO:  Alert      LABS:                        12.5   6.07  )-----------( 225      ( 12 Dec 2024 07:10 )             37.9         141  |  109[H]  |  12  ----------------------------<  117[H]  3.6   |  26  |  0.84    Ca    8.5      12 Dec 2024 07:10    TPro  6.3  /  Alb  2.9[L]  /  TBili  0.6  /  DBili  x   /  AST  86[H]  /  ALT  629[H]  /  AlkPhos  113  1212    PT/INR - ( 11 Dec 2024 13:41 )   PT: 11.2 sec;   INR: 0.96 ratio         PTT - ( 11 Dec 2024 13:41 )  PTT:31.9 sec  Urinalysis Basic - ( 12 Dec 2024 07:10 )    Color: x / Appearance: x / SG: x / pH: x  Gluc: 117 mg/dL / Ketone: x  / Bili: x / Urobili: x   Blood: x / Protein: x / Nitrite: x   Leuk Esterase: x / RBC: x / WBC x   Sq Epi: x / Non Sq Epi: x / Bacteria: x

## 2024-12-13 NOTE — CASE MANAGEMENT PROGRESS NOTE - NSCMPROGRESSNOTE_GEN_ALL_CORE
Per MD, patient is medically cleared for discharge home today.  CM met with patient and  at bedside to discussed discharge disposition is home with no formal services. Discharge notice signed and copy given to patient.   to transport patient home. Patient verbalized understanding of the transition plan and is in agreement. CM answered all questions to the best of my abilities. CM remains available throughout hospital stay.

## 2025-03-21 NOTE — PRE-OP CHECKLIST - WEIGHT IN KG
Fracture of left fifth metatarsal base, zone 1.   Post-op shoe for walking and I think it would be best if you don't use the crutch or the walking boot (if you need to, you need to)  If you are feeling good in the post-op shoe and want to transition to regular shoes (supportive athletic shoes) I think it is safe to do so  Elevation above heart level, compression, icing and tylenol (up to 3000 mg daily max for short term) for pain and swelling.   If needed, can take 600 mg ibuprofen every 6 hours as needed, but try to avoid NSAIDs (ibuprofen, naproxen) with fractures as much as able as there is a theoretical risk for delay in healing  2000 international units Vitamin D and 1200mg Calcium (2 Tums) daily for bone healing.   These are available over the counter  This is going to be for about 3 months total  Would discuss with your PCP about getting your TSH checked and making sure it is in the normal range  Work note written  Return to clinic in 4 weeks for the foot (one more set of x-rays).  Goal is that you are exclusively walking in the walking shoe or hopefully in regular shoes  
98.9

## 2025-05-28 ENCOUNTER — NON-APPOINTMENT (OUTPATIENT)
Age: 65
End: 2025-05-28

## 2025-05-28 ENCOUNTER — APPOINTMENT (OUTPATIENT)
Dept: CARDIOLOGY | Facility: CLINIC | Age: 65
End: 2025-05-28
Payer: OTHER MISCELLANEOUS

## 2025-05-28 VITALS
HEIGHT: 64 IN | SYSTOLIC BLOOD PRESSURE: 124 MMHG | BODY MASS INDEX: 35.85 KG/M2 | OXYGEN SATURATION: 97 % | HEART RATE: 88 BPM | DIASTOLIC BLOOD PRESSURE: 83 MMHG | WEIGHT: 210 LBS

## 2025-05-28 DIAGNOSIS — R94.31 ABNORMAL ELECTROCARDIOGRAM [ECG] [EKG]: ICD-10-CM

## 2025-05-28 DIAGNOSIS — R73.03 PREDIABETES.: ICD-10-CM

## 2025-05-28 DIAGNOSIS — Z82.49 FAMILY HISTORY OF ISCHEMIC HEART DISEASE AND OTHER DISEASES OF THE CIRCULATORY SYSTEM: ICD-10-CM

## 2025-05-28 DIAGNOSIS — E03.9 HYPOTHYROIDISM, UNSPECIFIED: ICD-10-CM

## 2025-05-28 DIAGNOSIS — E66.9 OBESITY, UNSPECIFIED: ICD-10-CM

## 2025-05-28 PROCEDURE — G2211 COMPLEX E/M VISIT ADD ON: CPT | Mod: NC

## 2025-05-28 PROCEDURE — 93000 ELECTROCARDIOGRAM COMPLETE: CPT

## 2025-05-28 PROCEDURE — 99204 OFFICE O/P NEW MOD 45 MIN: CPT

## 2025-05-28 RX ORDER — TIRZEPATIDE 12.5 MG/.5ML
12.5 INJECTION, SOLUTION SUBCUTANEOUS
Refills: 0 | Status: ACTIVE | COMMUNITY
Start: 2025-05-28

## 2025-05-28 RX ORDER — LEVOTHYROXINE SODIUM 175 UG/1
175 TABLET ORAL
Refills: 0 | Status: ACTIVE | COMMUNITY
Start: 2025-05-28

## 2025-06-09 ENCOUNTER — APPOINTMENT (OUTPATIENT)
Dept: CARDIOLOGY | Facility: CLINIC | Age: 65
End: 2025-06-09

## 2025-06-10 ENCOUNTER — APPOINTMENT (OUTPATIENT)
Dept: CARDIOLOGY | Facility: CLINIC | Age: 65
End: 2025-06-10
Payer: OTHER MISCELLANEOUS

## 2025-06-10 VITALS
SYSTOLIC BLOOD PRESSURE: 118 MMHG | HEIGHT: 64 IN | RESPIRATION RATE: 14 BRPM | BODY MASS INDEX: 35.72 KG/M2 | HEART RATE: 98 BPM | DIASTOLIC BLOOD PRESSURE: 88 MMHG | WEIGHT: 209.25 LBS | OXYGEN SATURATION: 97 %

## 2025-06-10 PROCEDURE — G2211 COMPLEX E/M VISIT ADD ON: CPT | Mod: NC

## 2025-06-10 PROCEDURE — 93306 TTE W/DOPPLER COMPLETE: CPT

## 2025-06-10 PROCEDURE — 99214 OFFICE O/P EST MOD 30 MIN: CPT

## 2025-06-17 ENCOUNTER — APPOINTMENT (OUTPATIENT)
Dept: CARDIOLOGY | Facility: CLINIC | Age: 65
End: 2025-06-17
Payer: COMMERCIAL

## 2025-06-17 ENCOUNTER — APPOINTMENT (OUTPATIENT)
Dept: CARDIOLOGY | Facility: CLINIC | Age: 65
End: 2025-06-17

## 2025-06-17 ENCOUNTER — NON-APPOINTMENT (OUTPATIENT)
Age: 65
End: 2025-06-17

## 2025-06-17 PROCEDURE — 93015 CV STRESS TEST SUPVJ I&R: CPT

## (undated) DEVICE — XI DRAPE ARM

## (undated) DEVICE — SOL IRR POUR NS 0.9% 1000ML

## (undated) DEVICE — SUT POLYSORB 0 30" GU-45

## (undated) DEVICE — SMOKE EVACUTATION SYS LAPROSCOPIC AC/PA

## (undated) DEVICE — XI ENDOWRIST SUCTION IRRIGATOR 8MM

## (undated) DEVICE — D HELP - CLEARVIEW CLEARIFY SYSTEM

## (undated) DEVICE — XI CORD BIPOLAR CAUTERY (BLUE)

## (undated) DEVICE — XI CORD MONOPOLAR CAUTERY (GREEN)

## (undated) DEVICE — GLV 7.5 PROTEXIS (WHITE)

## (undated) DEVICE — XI CANNULA SEAL 5MM - 8 MM

## (undated) DEVICE — PLV-SCD MACHINE: Type: DURABLE MEDICAL EQUIPMENT

## (undated) DEVICE — SUT MONOCRYL 4-0 27" PS-2 UNDYED

## (undated) DEVICE — DRSG DERMABOND 0.7ML

## (undated) DEVICE — SOL IRR BAG NS 0.9% 1000ML

## (undated) DEVICE — NDL HYPO REGULAR BEVEL 25G X 1.5" (BLUE)

## (undated) DEVICE — BLADE SCALPEL SAFETYLOCK #15

## (undated) DEVICE — XI DRAPE COLUMN

## (undated) DEVICE — XI ENDOWRIST 12 - 8 MM CANNULA REDUCER

## (undated) DEVICE — XI OBTURATOR OPTICAL BLADELESS 8MM

## (undated) DEVICE — VENODYNE/SCD SLEEVE CALF LARGE

## (undated) DEVICE — DRAPE TOWEL BLUE 17" X 24"

## (undated) DEVICE — WARMING BLANKET UPPER ADULT

## (undated) DEVICE — ELCTR BOVIE TIP BLADE INSULATED 2.75" EDGE

## (undated) DEVICE — DRAPE 3/4 SHEET W REINFORCEMENT 56X77"

## (undated) DEVICE — XI 12MM AND STAPLER CANNULA SEAL

## (undated) DEVICE — ENDOCATCH 10MM

## (undated) DEVICE — TUBING STRYKER PNEUMOCLEAR HIGH FLOW

## (undated) DEVICE — VENODYNE/SCD SLEEVE CALF MEDIUM

## (undated) DEVICE — BLADE SCALPEL SAFETY #11 WITH PLASTIC GREEN HANDLE

## (undated) DEVICE — SOL IRR POUR H2O 1000ML

## (undated) DEVICE — PACK GENERAL LAPAROSCOPY

## (undated) DEVICE — STAPLER SKIN PROXIMATE